# Patient Record
Sex: FEMALE | Race: WHITE | Employment: FULL TIME | URBAN - METROPOLITAN AREA
[De-identification: names, ages, dates, MRNs, and addresses within clinical notes are randomized per-mention and may not be internally consistent; named-entity substitution may affect disease eponyms.]

---

## 2017-07-19 PROCEDURE — 88175 CYTOPATH C/V AUTO FLUID REDO: CPT | Performed by: OBSTETRICS & GYNECOLOGY

## 2017-07-19 PROCEDURE — 87624 HPV HI-RISK TYP POOLED RSLT: CPT | Performed by: OBSTETRICS & GYNECOLOGY

## 2018-01-19 PROBLEM — Z34.00 SUPERVISION OF NORMAL FIRST PREGNANCY (HCC): Status: ACTIVE | Noted: 2018-01-19

## 2018-01-19 PROBLEM — Z34.00 SUPERVISION OF NORMAL FIRST PREGNANCY: Status: ACTIVE | Noted: 2018-01-19

## 2018-01-30 PROCEDURE — 86901 BLOOD TYPING SEROLOGIC RH(D): CPT | Performed by: OBSTETRICS & GYNECOLOGY

## 2018-01-30 PROCEDURE — 85025 COMPLETE CBC W/AUTO DIFF WBC: CPT | Performed by: OBSTETRICS & GYNECOLOGY

## 2018-01-30 PROCEDURE — 87086 URINE CULTURE/COLONY COUNT: CPT | Performed by: OBSTETRICS & GYNECOLOGY

## 2018-01-30 PROCEDURE — 86762 RUBELLA ANTIBODY: CPT | Performed by: OBSTETRICS & GYNECOLOGY

## 2018-01-30 PROCEDURE — 86850 RBC ANTIBODY SCREEN: CPT | Performed by: OBSTETRICS & GYNECOLOGY

## 2018-01-30 PROCEDURE — 87491 CHLMYD TRACH DNA AMP PROBE: CPT | Performed by: OBSTETRICS & GYNECOLOGY

## 2018-01-30 PROCEDURE — 87389 HIV-1 AG W/HIV-1&-2 AB AG IA: CPT | Performed by: OBSTETRICS & GYNECOLOGY

## 2018-01-30 PROCEDURE — 87591 N.GONORRHOEAE DNA AMP PROB: CPT | Performed by: OBSTETRICS & GYNECOLOGY

## 2018-01-30 PROCEDURE — 36415 COLL VENOUS BLD VENIPUNCTURE: CPT | Performed by: OBSTETRICS & GYNECOLOGY

## 2018-01-30 PROCEDURE — 87340 HEPATITIS B SURFACE AG IA: CPT | Performed by: OBSTETRICS & GYNECOLOGY

## 2018-01-30 PROCEDURE — 86900 BLOOD TYPING SEROLOGIC ABO: CPT | Performed by: OBSTETRICS & GYNECOLOGY

## 2018-01-30 PROCEDURE — 86780 TREPONEMA PALLIDUM: CPT | Performed by: OBSTETRICS & GYNECOLOGY

## 2018-02-14 ENCOUNTER — HOSPITAL ENCOUNTER (OUTPATIENT)
Age: 33
Discharge: HOME | End: 2018-02-14
Attending: FAMILY MEDICINE
Payer: COMMERCIAL

## 2018-02-14 VITALS
DIASTOLIC BLOOD PRESSURE: 83 MMHG | WEIGHT: 180 LBS | HEART RATE: 85 BPM | OXYGEN SATURATION: 100 % | SYSTOLIC BLOOD PRESSURE: 126 MMHG | HEIGHT: 67 IN | TEMPERATURE: 99 F | BODY MASS INDEX: 28.25 KG/M2 | RESPIRATION RATE: 16 BRPM

## 2018-02-14 DIAGNOSIS — R68.89 FLU-LIKE SYMPTOMS: Primary | ICD-10-CM

## 2018-02-14 DIAGNOSIS — R10.9 ABDOMINAL CRAMPING: ICD-10-CM

## 2018-02-14 DIAGNOSIS — J02.9 ACUTE PHARYNGITIS, UNSPECIFIED ETIOLOGY: ICD-10-CM

## 2018-02-14 LAB
POCT BILIRUBIN URINE: NEGATIVE
POCT BLOOD URINE: NEGATIVE
POCT GLUCOSE URINE: NEGATIVE MG/DL
POCT KETONE URINE: NEGATIVE MG/DL
POCT LEUKOCYTE ESTERASE URINE: NEGATIVE
POCT NITRITE URINE: NEGATIVE
POCT PH URINE: 7 (ref 5–8)
POCT PROTEIN URINE: NEGATIVE MG/DL
POCT RAPID STREP: NEGATIVE
POCT SPECIFIC GRAVITY URINE: 1.01
POCT URINE CLARITY: CLEAR
POCT URINE COLOR: YELLOW
POCT UROBILINOGEN URINE: 0.2 MG/DL

## 2018-02-14 PROCEDURE — 87081 CULTURE SCREEN ONLY: CPT | Performed by: FAMILY MEDICINE

## 2018-02-14 PROCEDURE — 87430 STREP A AG IA: CPT | Performed by: FAMILY MEDICINE

## 2018-02-14 PROCEDURE — 99204 OFFICE O/P NEW MOD 45 MIN: CPT

## 2018-02-14 PROCEDURE — 81002 URINALYSIS NONAUTO W/O SCOPE: CPT | Performed by: FAMILY MEDICINE

## 2018-02-14 RX ORDER — ONDANSETRON 4 MG/1
4 TABLET, ORALLY DISINTEGRATING ORAL EVERY 12 HOURS PRN
Qty: 10 TABLET | Refills: 0 | Status: SHIPPED | OUTPATIENT
Start: 2018-02-14 | End: 2018-05-14 | Stop reason: ALTCHOICE

## 2018-02-14 NOTE — ED INITIAL ASSESSMENT (HPI)
Pt c/o feeling nasal congestion, headache, mild sore throat and co-worker had sore throat, cough, and sneezing. Pt is 11 weeks pregnant, 1st pregnancy. Pt reports she has seen OB GYN for 1st appointment.  Pt also reports abdominal cramping across mid abdome

## 2018-02-15 NOTE — ED PROVIDER NOTES
Patient Seen in: [facility]    History   Patient presents with:  Cough/URI    Stated Complaint: COLD WANTS STREP TESTING, PREGNANT    HPI    21-year-old  1 para 0 female who is currently 11 weeks pregnant presents to and negative except as noted above.     Physical Exam   ED Triage Vitals [02/14/18 1729]  BP: 126/83  Pulse: 85  Resp: 16  Temp: 98.9 °F (37.2 °C)  Temp src: Temporal  SpO2: 100 %  O2 Device: None (Room air)    Current:/83   Pulse 85   Temp 98.9 °F (3 far as the flulike symptoms are concerned. Her abdominal symptoms could be related to the flu. I however paged and spoke to [place] [address] [facility]. Dr. Rosa Paula was on call for Dr. Penny Maharaj. Discussed the case with him.   he recommended close follow-up w

## 2018-02-21 PROCEDURE — 36415 COLL VENOUS BLD VENIPUNCTURE: CPT | Performed by: OBSTETRICS & GYNECOLOGY

## 2018-02-21 PROCEDURE — 81508 FTL CGEN ABNOR TWO PROTEINS: CPT | Performed by: OBSTETRICS & GYNECOLOGY

## 2018-03-19 PROCEDURE — 82105 ALPHA-FETOPROTEIN SERUM: CPT | Performed by: OBSTETRICS & GYNECOLOGY

## 2018-03-19 PROCEDURE — 36415 COLL VENOUS BLD VENIPUNCTURE: CPT | Performed by: OBSTETRICS & GYNECOLOGY

## 2018-06-04 PROCEDURE — 86780 TREPONEMA PALLIDUM: CPT | Performed by: OBSTETRICS & GYNECOLOGY

## 2018-06-04 PROCEDURE — 82950 GLUCOSE TEST: CPT | Performed by: OBSTETRICS & GYNECOLOGY

## 2018-06-04 PROCEDURE — 87389 HIV-1 AG W/HIV-1&-2 AB AG IA: CPT | Performed by: OBSTETRICS & GYNECOLOGY

## 2018-06-11 PROCEDURE — 36415 COLL VENOUS BLD VENIPUNCTURE: CPT | Performed by: OBSTETRICS & GYNECOLOGY

## 2018-06-11 PROCEDURE — 82952 GTT-ADDED SAMPLES: CPT | Performed by: OBSTETRICS & GYNECOLOGY

## 2018-06-11 PROCEDURE — 82951 GLUCOSE TOLERANCE TEST (GTT): CPT | Performed by: OBSTETRICS & GYNECOLOGY

## 2018-08-03 ENCOUNTER — HOSPITAL ENCOUNTER (INPATIENT)
Facility: HOSPITAL | Age: 33
LOS: 6 days | Discharge: HOME | End: 2018-08-09
Attending: OBSTETRICS & GYNECOLOGY | Admitting: OBSTETRICS & GYNECOLOGY
Payer: COMMERCIAL

## 2018-08-03 PROBLEM — Z34.90 PREGNANCY (HCC): Status: ACTIVE | Noted: 2018-08-03

## 2018-08-03 PROBLEM — Z34.90 PREGNANCY: Status: ACTIVE | Noted: 2018-08-03

## 2018-08-03 LAB
ALBUMIN SERPL-MCNC: 2.6 G/DL (ref 3.5–4.8)
ALBUMIN/GLOB SERPL: 0.7 {RATIO} (ref 1–2)
ALP LIVER SERPL-CCNC: 161 U/L (ref 37–98)
ALT SERPL-CCNC: 52 U/L (ref 14–54)
ANION GAP SERPL CALC-SCNC: 8 MMOL/L (ref 0–18)
AST SERPL-CCNC: 66 U/L (ref 15–41)
BASOPHILS # BLD AUTO: 0.03 X10(3) UL (ref 0–0.1)
BASOPHILS NFR BLD AUTO: 0.3 %
BILIRUB SERPL-MCNC: 0.6 MG/DL (ref 0.1–2)
BUN BLD-MCNC: 8 MG/DL (ref 8–20)
BUN/CREAT SERPL: 9.4 (ref 10–20)
CALCIUM BLD-MCNC: 8.2 MG/DL (ref 8.3–10.3)
CHLORIDE SERPL-SCNC: 105 MMOL/L (ref 101–111)
CO2 SERPL-SCNC: 24 MMOL/L (ref 22–32)
CREAT BLD-MCNC: 0.85 MG/DL (ref 0.55–1.02)
EOSINOPHIL # BLD AUTO: 0.07 X10(3) UL (ref 0–0.3)
EOSINOPHIL NFR BLD AUTO: 0.6 %
ERYTHROCYTE [DISTWIDTH] IN BLOOD BY AUTOMATED COUNT: 12.5 % (ref 11.5–16)
GLOBULIN PLAS-MCNC: 3.7 G/DL (ref 2.5–3.7)
GLUCOSE BLD-MCNC: 129 MG/DL (ref 70–99)
HCT VFR BLD AUTO: 30.5 % (ref 34–50)
HGB BLD-MCNC: 10.5 G/DL (ref 12–16)
IMMATURE GRANULOCYTE COUNT: 0.1 X10(3) UL (ref 0–1)
IMMATURE GRANULOCYTE RATIO %: 0.8 %
LYMPHOCYTES # BLD AUTO: 1.68 X10(3) UL (ref 0.9–4)
LYMPHOCYTES NFR BLD AUTO: 14.3 %
M PROTEIN MFR SERPL ELPH: 6.3 G/DL (ref 6.1–8.3)
MCH RBC QN AUTO: 32.2 PG (ref 27–33.2)
MCHC RBC AUTO-ENTMCNC: 34.4 G/DL (ref 31–37)
MCV RBC AUTO: 93.6 FL (ref 81–100)
MONOCYTES # BLD AUTO: 0.82 X10(3) UL (ref 0.1–1)
MONOCYTES NFR BLD AUTO: 7 %
NEUTROPHIL ABS PRELIM: 9.08 X10 (3) UL (ref 1.3–6.7)
NEUTROPHILS # BLD AUTO: 9.08 X10(3) UL (ref 1.3–6.7)
NEUTROPHILS NFR BLD AUTO: 77 %
OSMOLALITY SERPL CALC.SUM OF ELEC: 284 MOSM/KG (ref 275–295)
PLATELET # BLD AUTO: 110 10(3)UL (ref 150–450)
POTASSIUM SERPL-SCNC: 3.4 MMOL/L (ref 3.6–5.1)
RBC # BLD AUTO: 3.26 X10(6)UL (ref 3.8–5.1)
RED CELL DISTRIBUTION WIDTH-SD: 42.2 FL (ref 35.1–46.3)
SODIUM SERPL-SCNC: 137 MMOL/L (ref 136–144)
URATE SERPL-MCNC: 5.6 MG/DL (ref 2.4–8)
WBC # BLD AUTO: 11.8 X10(3) UL (ref 4–13)

## 2018-08-03 PROCEDURE — 85025 COMPLETE CBC W/AUTO DIFF WBC: CPT | Performed by: OBSTETRICS & GYNECOLOGY

## 2018-08-03 PROCEDURE — 80053 COMPREHEN METABOLIC PANEL: CPT | Performed by: OBSTETRICS & GYNECOLOGY

## 2018-08-03 PROCEDURE — 87081 CULTURE SCREEN ONLY: CPT | Performed by: OBSTETRICS & GYNECOLOGY

## 2018-08-03 PROCEDURE — 84550 ASSAY OF BLOOD/URIC ACID: CPT | Performed by: OBSTETRICS & GYNECOLOGY

## 2018-08-03 PROCEDURE — 87653 STREP B DNA AMP PROBE: CPT | Performed by: OBSTETRICS & GYNECOLOGY

## 2018-08-03 RX ORDER — BETAMETHASONE SODIUM PHOSPHATE AND BETAMETHASONE ACETATE 3; 3 MG/ML; MG/ML
12 INJECTION, SUSPENSION INTRA-ARTICULAR; INTRALESIONAL; INTRAMUSCULAR; SOFT TISSUE EVERY 24 HOURS
Status: COMPLETED | OUTPATIENT
Start: 2018-08-03 | End: 2018-08-04

## 2018-08-03 NOTE — PLAN OF CARE
Problem: Patient/Family Goals  Goal: Patient/Family Long Term Goal  Patient's Long Term Goal: Maintain pregnanct till term    Interventions:  - See additional Care Plan goals for specific interventions   Outcome: Progressing    Goal: Patient/Family Short T

## 2018-08-03 NOTE — PROGRESS NOTES
Pt is a 29year old female admitted to 122/122-A. Patient presents with:  R/o Pih     Pt is  35w6d intra-uterine pregnancy. Pt transferred via wheelchair in stable condition to room 122 from triage 5. Plan of care reviewed.  Call light within

## 2018-08-03 NOTE — PROGRESS NOTES
Pt is a 29year old female admitted to TRG5/TRG5-A, Patient presents with:  R/o Pih     Pt is 35w6d intra-uterine pregnancy. Denies any leaking of fluid. Reports +fetal movement. History obtained, consents signed.  Oriented to room, staff, and plan of car

## 2018-08-04 LAB
ALBUMIN SERPL-MCNC: 2.6 G/DL (ref 3.5–4.8)
ALBUMIN/GLOB SERPL: 0.7 {RATIO} (ref 1–2)
ALP LIVER SERPL-CCNC: 159 U/L (ref 37–98)
ALT SERPL-CCNC: 56 U/L (ref 14–54)
ANION GAP SERPL CALC-SCNC: 12 MMOL/L (ref 0–18)
ANTIBODY SCREEN: NEGATIVE
AST SERPL-CCNC: 65 U/L (ref 15–41)
BASOPHILS # BLD AUTO: 0.02 X10(3) UL (ref 0–0.1)
BASOPHILS NFR BLD AUTO: 0.1 %
BILIRUB SERPL-MCNC: 0.6 MG/DL (ref 0.1–2)
BUN BLD-MCNC: 5 MG/DL (ref 8–20)
BUN/CREAT SERPL: 7.7 (ref 10–20)
CALCIUM BLD-MCNC: 8.9 MG/DL (ref 8.3–10.3)
CHLORIDE SERPL-SCNC: 108 MMOL/L (ref 101–111)
CO2 SERPL-SCNC: 21 MMOL/L (ref 22–32)
CREAT BLD-MCNC: 0.65 MG/DL (ref 0.55–1.02)
CREAT UR-SCNC: 1.59 G/24 HR (ref 0.6–1.8)
EOSINOPHIL # BLD AUTO: 0.01 X10(3) UL (ref 0–0.3)
EOSINOPHIL NFR BLD AUTO: 0.1 %
ERYTHROCYTE [DISTWIDTH] IN BLOOD BY AUTOMATED COUNT: 12.3 % (ref 11.5–16)
GLOBULIN PLAS-MCNC: 3.8 G/DL (ref 2.5–3.7)
GLUCOSE BLD-MCNC: 89 MG/DL (ref 70–99)
HCT VFR BLD AUTO: 31.8 % (ref 34–50)
HGB BLD-MCNC: 10.7 G/DL (ref 12–16)
IMMATURE GRANULOCYTE COUNT: 0.4 X10(3) UL (ref 0–1)
IMMATURE GRANULOCYTE RATIO %: 2.3 %
LYMPHOCYTES # BLD AUTO: 1.3 X10(3) UL (ref 0.9–4)
LYMPHOCYTES NFR BLD AUTO: 7.5 %
M PROTEIN 24H UR ELPH-MRATE: 279 MG/24 HR (ref ?–100)
M PROTEIN MFR SERPL ELPH: 6.4 G/DL (ref 6.1–8.3)
MCH RBC QN AUTO: 31.8 PG (ref 27–33.2)
MCHC RBC AUTO-ENTMCNC: 33.6 G/DL (ref 31–37)
MCV RBC AUTO: 94.6 FL (ref 81–100)
MONOCYTES # BLD AUTO: 0.65 X10(3) UL (ref 0.1–1)
MONOCYTES NFR BLD AUTO: 3.8 %
NEUTROPHIL ABS PRELIM: 14.92 X10 (3) UL (ref 1.3–6.7)
NEUTROPHILS # BLD AUTO: 14.92 X10(3) UL (ref 1.3–6.7)
NEUTROPHILS NFR BLD AUTO: 86.2 %
OSMOLALITY SERPL CALC.SUM OF ELEC: 289 MOSM/KG (ref 275–295)
PLATELET # BLD AUTO: 126 10(3)UL (ref 150–450)
POTASSIUM SERPL-SCNC: 3.7 MMOL/L (ref 3.6–5.1)
RBC # BLD AUTO: 3.36 X10(6)UL (ref 3.8–5.1)
RED CELL DISTRIBUTION WIDTH-SD: 41.9 FL (ref 35.1–46.3)
RH BLOOD TYPE: POSITIVE
SODIUM SERPL-SCNC: 141 MMOL/L (ref 136–144)
SPECIMEN VOL UR: 4100 ML
SPECIMEN VOL UR: 4100 ML
T PALLIDUM AB SER QL IA: NONREACTIVE
URATE SERPL-MCNC: 5.6 MG/DL (ref 2.4–8)
WBC # BLD AUTO: 17.3 X10(3) UL (ref 4–13)

## 2018-08-04 PROCEDURE — 86901 BLOOD TYPING SEROLOGIC RH(D): CPT | Performed by: OBSTETRICS & GYNECOLOGY

## 2018-08-04 PROCEDURE — 86900 BLOOD TYPING SEROLOGIC ABO: CPT | Performed by: OBSTETRICS & GYNECOLOGY

## 2018-08-04 PROCEDURE — 85025 COMPLETE CBC W/AUTO DIFF WBC: CPT | Performed by: OBSTETRICS & GYNECOLOGY

## 2018-08-04 PROCEDURE — 84550 ASSAY OF BLOOD/URIC ACID: CPT | Performed by: OBSTETRICS & GYNECOLOGY

## 2018-08-04 PROCEDURE — 3E0P7VZ INTRODUCTION OF HORMONE INTO FEMALE REPRODUCTIVE, VIA NATURAL OR ARTIFICIAL OPENING: ICD-10-PCS | Performed by: OBSTETRICS & GYNECOLOGY

## 2018-08-04 PROCEDURE — 80053 COMPREHEN METABOLIC PANEL: CPT | Performed by: OBSTETRICS & GYNECOLOGY

## 2018-08-04 PROCEDURE — 86850 RBC ANTIBODY SCREEN: CPT | Performed by: OBSTETRICS & GYNECOLOGY

## 2018-08-04 PROCEDURE — 86780 TREPONEMA PALLIDUM: CPT | Performed by: OBSTETRICS & GYNECOLOGY

## 2018-08-04 PROCEDURE — 82570 ASSAY OF URINE CREATININE: CPT | Performed by: OBSTETRICS & GYNECOLOGY

## 2018-08-04 PROCEDURE — 84156 ASSAY OF PROTEIN URINE: CPT | Performed by: OBSTETRICS & GYNECOLOGY

## 2018-08-04 RX ORDER — TRISODIUM CITRATE DIHYDRATE AND CITRIC ACID MONOHYDRATE 500; 334 MG/5ML; MG/5ML
30 SOLUTION ORAL AS NEEDED
Status: DISCONTINUED | OUTPATIENT
Start: 2018-08-04 | End: 2018-08-07 | Stop reason: HOSPADM

## 2018-08-04 RX ORDER — IBUPROFEN 600 MG/1
600 TABLET ORAL ONCE AS NEEDED
Status: ACTIVE | OUTPATIENT
Start: 2018-08-04 | End: 2018-08-06

## 2018-08-04 RX ORDER — ZOLPIDEM TARTRATE 5 MG/1
5 TABLET ORAL NIGHTLY PRN
Status: DISCONTINUED | OUTPATIENT
Start: 2018-08-04 | End: 2018-08-07 | Stop reason: HOSPADM

## 2018-08-04 RX ORDER — DEXTROSE, SODIUM CHLORIDE, SODIUM LACTATE, POTASSIUM CHLORIDE, AND CALCIUM CHLORIDE 5; .6; .31; .03; .02 G/100ML; G/100ML; G/100ML; G/100ML; G/100ML
INJECTION, SOLUTION INTRAVENOUS AS NEEDED
Status: DISCONTINUED | OUTPATIENT
Start: 2018-08-04 | End: 2018-08-07 | Stop reason: HOSPADM

## 2018-08-04 RX ORDER — CEFAZOLIN SODIUM/WATER 2 G/20 ML
2 SYRINGE (ML) INTRAVENOUS EVERY 8 HOURS
Status: DISCONTINUED | OUTPATIENT
Start: 2018-08-05 | End: 2018-08-07 | Stop reason: HOSPADM

## 2018-08-04 RX ORDER — SODIUM CHLORIDE, SODIUM LACTATE, POTASSIUM CHLORIDE, CALCIUM CHLORIDE 600; 310; 30; 20 MG/100ML; MG/100ML; MG/100ML; MG/100ML
INJECTION, SOLUTION INTRAVENOUS CONTINUOUS
Status: DISCONTINUED | OUTPATIENT
Start: 2018-08-04 | End: 2018-08-07 | Stop reason: HOSPADM

## 2018-08-04 RX ORDER — TERBUTALINE SULFATE 1 MG/ML
0.25 INJECTION, SOLUTION SUBCUTANEOUS AS NEEDED
Status: DISCONTINUED | OUTPATIENT
Start: 2018-08-04 | End: 2018-08-07 | Stop reason: HOSPADM

## 2018-08-04 NOTE — PROGRESS NOTES
[facility]      Labor Progress Note    Gail Gaona  [facility] Patient Status:  Inpatient    1985 MRN [id number]   Location [facility] [facility] Attending Benedict Bob MD   Hosp Day # 1 PCP Ti Mcghee MD     SUBJECTIVE:    Interval

## 2018-08-04 NOTE — CONSULTS
[facility]  Maternal-Fetal Medicine Inpatient Consultation    Date of Admission:  8/3/2018  Date of Consult:  8/4/2018    Reason for Consult:   preeclampsia    History of Present Illness:  Salud Castillo is a a(n) 29year old female.  G1 wit that she drinks about 1.2 oz of alcohol per week . She reports that she does not use drugs.     Allergies:    Pcns [Penicillins]      HIVES    Medications:    Current Facility-Administered Medications:   •  betamethasone sod phos & acet (CELESTONE) 6 (3-3) participate in the care of your patient. Please do not hesitate to contact me if additional questions or concerns arise. The majority of the time (>50%) was spent in review of records, consultation and coordination of care.   Our discussion is summarized

## 2018-08-04 NOTE — H&P
[address] and Delivery Prenatal History and Physical Interval Addendum  Please see full Prenatal Record for this pregnancy      SUBJECTIVE:    Interval History:      This is a pregnancy at 35w6d weeks admitted for observation due to elevated BP

## 2018-08-05 LAB
ALBUMIN SERPL-MCNC: 2.7 G/DL (ref 3.5–4.8)
ALBUMIN/GLOB SERPL: 0.8 {RATIO} (ref 1–2)
ALP LIVER SERPL-CCNC: 154 U/L (ref 37–98)
ALT SERPL-CCNC: 53 U/L (ref 14–54)
ANION GAP SERPL CALC-SCNC: 12 MMOL/L (ref 0–18)
AST SERPL-CCNC: 48 U/L (ref 15–41)
BILIRUB SERPL-MCNC: 0.4 MG/DL (ref 0.1–2)
BUN BLD-MCNC: 7 MG/DL (ref 8–20)
BUN/CREAT SERPL: 7.9 (ref 10–20)
CALCIUM BLD-MCNC: 8.5 MG/DL (ref 8.3–10.3)
CHLORIDE SERPL-SCNC: 106 MMOL/L (ref 101–111)
CO2 SERPL-SCNC: 22 MMOL/L (ref 22–32)
CREAT BLD-MCNC: 0.89 MG/DL (ref 0.55–1.02)
ERYTHROCYTE [DISTWIDTH] IN BLOOD BY AUTOMATED COUNT: 12.8 % (ref 11.5–16)
GLOBULIN PLAS-MCNC: 3.6 G/DL (ref 2.5–3.7)
GLUCOSE BLD-MCNC: 94 MG/DL (ref 70–99)
HCT VFR BLD AUTO: 30.9 % (ref 34–50)
HGB BLD-MCNC: 10.4 G/DL (ref 12–16)
M PROTEIN MFR SERPL ELPH: 6.3 G/DL (ref 6.1–8.3)
MCH RBC QN AUTO: 32.1 PG (ref 27–33.2)
MCHC RBC AUTO-ENTMCNC: 33.7 G/DL (ref 31–37)
MCV RBC AUTO: 95.4 FL (ref 81–100)
OSMOLALITY SERPL CALC.SUM OF ELEC: 288 MOSM/KG (ref 275–295)
PLATELET # BLD AUTO: 158 10(3)UL (ref 150–450)
POTASSIUM SERPL-SCNC: 3.6 MMOL/L (ref 3.6–5.1)
RBC # BLD AUTO: 3.24 X10(6)UL (ref 3.8–5.1)
RED CELL DISTRIBUTION WIDTH-SD: 42.8 FL (ref 35.1–46.3)
SODIUM SERPL-SCNC: 140 MMOL/L (ref 136–144)
URATE SERPL-MCNC: 5.7 MG/DL (ref 2.4–8)
WBC # BLD AUTO: 19.5 X10(3) UL (ref 4–13)

## 2018-08-05 PROCEDURE — 80053 COMPREHEN METABOLIC PANEL: CPT | Performed by: OBSTETRICS & GYNECOLOGY

## 2018-08-05 PROCEDURE — 3E033VJ INTRODUCTION OF OTHER HORMONE INTO PERIPHERAL VEIN, PERCUTANEOUS APPROACH: ICD-10-PCS | Performed by: OBSTETRICS & GYNECOLOGY

## 2018-08-05 PROCEDURE — 84550 ASSAY OF BLOOD/URIC ACID: CPT | Performed by: OBSTETRICS & GYNECOLOGY

## 2018-08-05 PROCEDURE — 85027 COMPLETE CBC AUTOMATED: CPT | Performed by: OBSTETRICS & GYNECOLOGY

## 2018-08-05 RX ORDER — HYDROMORPHONE HYDROCHLORIDE 1 MG/ML
1 INJECTION, SOLUTION INTRAMUSCULAR; INTRAVENOUS; SUBCUTANEOUS
Status: DISCONTINUED | OUTPATIENT
Start: 2018-08-05 | End: 2018-08-05 | Stop reason: ALTCHOICE

## 2018-08-05 RX ORDER — MORPHINE SULFATE 4 MG/ML
5 INJECTION, SOLUTION INTRAMUSCULAR; INTRAVENOUS
Status: DISCONTINUED | OUTPATIENT
Start: 2018-08-05 | End: 2018-08-05 | Stop reason: ALTCHOICE

## 2018-08-05 NOTE — PROGRESS NOTES
[facility]      Labor Progress Note    Zaid Husainlpine  [facility] Patient Status:  Inpatient    1985 MRN [id number]   Location [facility] [facility] Attending Joy Baez MD   Hosp Day # 2 PCP Honey Boss MD     SUBJECTIVE:    Interval

## 2018-08-06 PROBLEM — O99.820 GROUP B STREPTOCOCCUS CARRIER, +RV CULTURE, CURRENTLY PREGNANT: Status: ACTIVE | Noted: 2018-08-06

## 2018-08-06 PROBLEM — O99.820 GROUP B STREPTOCOCCUS CARRIER, +RV CULTURE, CURRENTLY PREGNANT (HCC): Status: ACTIVE | Noted: 2018-08-06

## 2018-08-06 PROCEDURE — 10907ZC DRAINAGE OF AMNIOTIC FLUID, THERAPEUTIC FROM PRODUCTS OF CONCEPTION, VIA NATURAL OR ARTIFICIAL OPENING: ICD-10-PCS | Performed by: OBSTETRICS & GYNECOLOGY

## 2018-08-06 RX ORDER — CALCIUM GLUCONATE 94 MG/ML
1 INJECTION, SOLUTION INTRAVENOUS ONCE AS NEEDED
Status: ACTIVE | OUTPATIENT
Start: 2018-08-06 | End: 2018-08-06

## 2018-08-06 RX ORDER — NALBUPHINE HCL 10 MG/ML
2.5 AMPUL (ML) INJECTION
Status: DISCONTINUED | OUTPATIENT
Start: 2018-08-06 | End: 2018-08-08

## 2018-08-06 RX ORDER — CALCIUM GLUCONATE 94 MG/ML
1 INJECTION, SOLUTION INTRAVENOUS ONCE AS NEEDED
Status: DISCONTINUED | OUTPATIENT
Start: 2018-08-06 | End: 2018-08-06 | Stop reason: SDUPTHER

## 2018-08-06 RX ORDER — EPHEDRINE SULFATE/0.9% NACL/PF 25 MG/5 ML
5 SYRINGE (ML) INTRAVENOUS AS NEEDED
Status: DISCONTINUED | OUTPATIENT
Start: 2018-08-06 | End: 2018-08-08

## 2018-08-06 RX ORDER — ONDANSETRON 2 MG/ML
4 INJECTION INTRAMUSCULAR; INTRAVENOUS EVERY 6 HOURS PRN
Status: DISCONTINUED | OUTPATIENT
Start: 2018-08-06 | End: 2018-08-07

## 2018-08-06 NOTE — PROGRESS NOTES
Dr. Viraj Ford notified of patient blood pressures. Orders given to bypass labetalol and to begin magnesium sulfate protocol immediately.  Will continue to monitor patient

## 2018-08-06 NOTE — PROGRESS NOTES
Report received from [facility]. Patient received in stable condition.  Cervidil to be d/c'd at 0800

## 2018-08-06 NOTE — PROGRESS NOTES
Labor Progress Note    Pt concerned about severe discomfort with SVEs during this admission. On 20mU/min pitocin at this time with contractions q2-3 minutes, but only mild cramping. Discussed need for AROM attempt.  Offered iv stadol prior to SVE with pt

## 2018-08-06 NOTE — PROGRESS NOTES
Uncomfortable w/RN cervical exam this AM w/cervidil removal  RN states difficult to say how dilated due to pt tolerance    IOL for preeclampsia with severe features at 39 w  S/p betamethasone 8/3-4  Initial labs showed decreased platelets and elevated AST

## 2018-08-07 LAB
ALBUMIN SERPL-MCNC: 2.2 G/DL (ref 3.5–4.8)
ALBUMIN SERPL-MCNC: 2.3 G/DL (ref 3.5–4.8)
ALBUMIN/GLOB SERPL: 0.7 {RATIO} (ref 1–2)
ALBUMIN/GLOB SERPL: 0.8 {RATIO} (ref 1–2)
ALP LIVER SERPL-CCNC: 141 U/L (ref 37–98)
ALP LIVER SERPL-CCNC: 155 U/L (ref 37–98)
ALT SERPL-CCNC: 125 U/L (ref 14–54)
ALT SERPL-CCNC: 98 U/L (ref 14–54)
ANION GAP SERPL CALC-SCNC: 8 MMOL/L (ref 0–18)
ANION GAP SERPL CALC-SCNC: 8 MMOL/L (ref 0–18)
AST SERPL-CCNC: 132 U/L (ref 15–41)
AST SERPL-CCNC: 196 U/L (ref 15–41)
BAND %: 5 %
BAND %: 9 %
BASOPHIL % MANUAL: 0 %
BASOPHIL % MANUAL: 0 %
BASOPHIL ABSOLUTE MANUAL: 0 X10(3) UL (ref 0–0.1)
BASOPHIL ABSOLUTE MANUAL: 0 X10(3) UL (ref 0–0.1)
BILIRUB SERPL-MCNC: 1.2 MG/DL (ref 0.1–2)
BILIRUB SERPL-MCNC: 1.3 MG/DL (ref 0.1–2)
BUN BLD-MCNC: 10 MG/DL (ref 8–20)
BUN BLD-MCNC: 9 MG/DL (ref 8–20)
BUN/CREAT SERPL: 8.5 (ref 10–20)
BUN/CREAT SERPL: 8.6 (ref 10–20)
CALCIUM BLD-MCNC: 6.8 MG/DL (ref 8.3–10.3)
CALCIUM BLD-MCNC: 7.1 MG/DL (ref 8.3–10.3)
CHLORIDE SERPL-SCNC: 103 MMOL/L (ref 101–111)
CHLORIDE SERPL-SCNC: 106 MMOL/L (ref 101–111)
CO2 SERPL-SCNC: 26 MMOL/L (ref 22–32)
CO2 SERPL-SCNC: 26 MMOL/L (ref 22–32)
CREAT BLD-MCNC: 1.06 MG/DL (ref 0.55–1.02)
CREAT BLD-MCNC: 1.16 MG/DL (ref 0.55–1.02)
EOSINOPHIL % MANUAL: 0 %
EOSINOPHIL % MANUAL: 0 %
EOSINOPHIL ABSOLUTE MANUAL: 0 X10(3) UL (ref 0–0.3)
EOSINOPHIL ABSOLUTE MANUAL: 0 X10(3) UL (ref 0–0.3)
ERYTHROCYTE [DISTWIDTH] IN BLOOD BY AUTOMATED COUNT: 13.5 % (ref 11.5–16)
ERYTHROCYTE [DISTWIDTH] IN BLOOD BY AUTOMATED COUNT: 13.5 % (ref 11.5–16)
GLOBULIN PLAS-MCNC: 2.8 G/DL (ref 2.5–3.7)
GLOBULIN PLAS-MCNC: 3.2 G/DL (ref 2.5–3.7)
GLUCOSE BLD-MCNC: 136 MG/DL (ref 70–99)
GLUCOSE BLD-MCNC: 86 MG/DL (ref 70–99)
HAV IGM SER QL: 5.2 MG/DL (ref 1.8–2.5)
HAV IGM SER QL: 6.1 MG/DL (ref 1.8–2.5)
HCT VFR BLD AUTO: 28.6 % (ref 34–50)
HCT VFR BLD AUTO: 32.7 % (ref 34–50)
HGB BLD-MCNC: 11 G/DL (ref 12–16)
HGB BLD-MCNC: 9.7 G/DL (ref 12–16)
LYMPHOCYTE % MANUAL: 6 %
LYMPHOCYTE % MANUAL: 7 %
LYMPHOCYTE ABSOLUTE MANUAL: 1.15 X10(3) UL (ref 0.9–4)
LYMPHOCYTE ABSOLUTE MANUAL: 1.65 X10(3) UL (ref 0.9–4)
M PROTEIN MFR SERPL ELPH: 5 G/DL (ref 6.1–8.3)
M PROTEIN MFR SERPL ELPH: 5.5 G/DL (ref 6.1–8.3)
MCH RBC QN AUTO: 32.2 PG (ref 27–33.2)
MCH RBC QN AUTO: 32.3 PG (ref 27–33.2)
MCHC RBC AUTO-ENTMCNC: 33.6 G/DL (ref 31–37)
MCHC RBC AUTO-ENTMCNC: 33.9 G/DL (ref 31–37)
MCV RBC AUTO: 95.3 FL (ref 81–100)
MCV RBC AUTO: 95.6 FL (ref 81–100)
METAMYELOCYTE %: 1 %
METAMYELOCYTE ABSOLUTE MANUAL: 0.24 X10(3) UL (ref ?–0.01)
MONOCYTE % MANUAL: 2 %
MONOCYTE % MANUAL: 6 %
MONOCYTE ABSOLUTE MANUAL: 0.47 X10(3) UL (ref 0.1–1)
MONOCYTE ABSOLUTE MANUAL: 1.15 X10(3) UL (ref 0.1–1)
MORPHOLOGY: NORMAL
MORPHOLOGY: NORMAL
NEUTROPHIL ABS PRELIM: 15.57 X10 (3) UL (ref 1.3–6.7)
NEUTROPHIL ABS PRELIM: 19.65 X10 (3) UL (ref 1.3–6.7)
NEUTROPHIL ABSOLUTE MANUAL: 16.9 X10(3) UL (ref 1.3–6.7)
NEUTROPHIL ABSOLUTE MANUAL: 21.24 X10(3) UL (ref 1.3–6.7)
NEUTROPHILS % MANUAL: 79 %
NEUTROPHILS % MANUAL: 85 %
OSMOLALITY SERPL CALC.SUM OF ELEC: 285 MOSM/KG (ref 275–295)
OSMOLALITY SERPL CALC.SUM OF ELEC: 288 MOSM/KG (ref 275–295)
PLATELET # BLD AUTO: 59 10(3)UL (ref 150–450)
PLATELET # BLD AUTO: 75 10(3)UL (ref 150–450)
PLATELET MORPHOLOGY: NORMAL
PLATELET MORPHOLOGY: NORMAL
POTASSIUM SERPL-SCNC: 3.2 MMOL/L (ref 3.6–5.1)
POTASSIUM SERPL-SCNC: 3.7 MMOL/L (ref 3.6–5.1)
RBC # BLD AUTO: 3 X10(6)UL (ref 3.8–5.1)
RBC # BLD AUTO: 3.42 X10(6)UL (ref 3.8–5.1)
RED CELL DISTRIBUTION WIDTH-SD: 44.7 FL (ref 35.1–46.3)
RED CELL DISTRIBUTION WIDTH-SD: 45.4 FL (ref 35.1–46.3)
SODIUM SERPL-SCNC: 137 MMOL/L (ref 136–144)
SODIUM SERPL-SCNC: 140 MMOL/L (ref 136–144)
TOTAL CELLS COUNTED: 100
TOTAL CELLS COUNTED: 100
URATE SERPL-MCNC: 5.3 MG/DL (ref 2.4–8)
WBC # BLD AUTO: 19.2 X10(3) UL (ref 4–13)
WBC # BLD AUTO: 23.6 X10(3) UL (ref 4–13)

## 2018-08-07 PROCEDURE — 85007 BL SMEAR W/DIFF WBC COUNT: CPT | Performed by: OBSTETRICS & GYNECOLOGY

## 2018-08-07 PROCEDURE — 80053 COMPREHEN METABOLIC PANEL: CPT | Performed by: OBSTETRICS & GYNECOLOGY

## 2018-08-07 PROCEDURE — 0KQM0ZZ REPAIR PERINEUM MUSCLE, OPEN APPROACH: ICD-10-PCS | Performed by: OBSTETRICS & GYNECOLOGY

## 2018-08-07 PROCEDURE — 83735 ASSAY OF MAGNESIUM: CPT | Performed by: OBSTETRICS & GYNECOLOGY

## 2018-08-07 PROCEDURE — 88321 CONSLTJ&REPRT SLD PREP ELSWR: CPT | Performed by: OBSTETRICS & GYNECOLOGY

## 2018-08-07 PROCEDURE — 85025 COMPLETE CBC W/AUTO DIFF WBC: CPT | Performed by: OBSTETRICS & GYNECOLOGY

## 2018-08-07 PROCEDURE — 85027 COMPLETE CBC AUTOMATED: CPT | Performed by: OBSTETRICS & GYNECOLOGY

## 2018-08-07 PROCEDURE — 88307 TISSUE EXAM BY PATHOLOGIST: CPT | Performed by: OBSTETRICS & GYNECOLOGY

## 2018-08-07 RX ORDER — ACETAMINOPHEN 500 MG
TABLET ORAL
Status: COMPLETED
Start: 2018-08-07 | End: 2018-08-07

## 2018-08-07 RX ORDER — SODIUM CHLORIDE, SODIUM LACTATE, POTASSIUM CHLORIDE, CALCIUM CHLORIDE 600; 310; 30; 20 MG/100ML; MG/100ML; MG/100ML; MG/100ML
INJECTION, SOLUTION INTRAVENOUS CONTINUOUS
Status: DISCONTINUED | OUTPATIENT
Start: 2018-08-07 | End: 2018-08-08

## 2018-08-07 RX ORDER — MISOPROSTOL 200 UG/1
TABLET ORAL
Status: COMPLETED
Start: 2018-08-07 | End: 2018-08-07

## 2018-08-07 RX ORDER — SIMETHICONE 80 MG
80 TABLET,CHEWABLE ORAL 3 TIMES DAILY PRN
Status: DISCONTINUED | OUTPATIENT
Start: 2018-08-07 | End: 2018-08-09

## 2018-08-07 RX ORDER — DOCUSATE SODIUM 100 MG/1
100 CAPSULE, LIQUID FILLED ORAL
Status: DISCONTINUED | OUTPATIENT
Start: 2018-08-07 | End: 2018-08-09

## 2018-08-07 RX ORDER — CARBOPROST TROMETHAMINE 250 UG/ML
INJECTION, SOLUTION INTRAMUSCULAR
Status: DISCONTINUED
Start: 2018-08-07 | End: 2018-08-07 | Stop reason: WASHOUT

## 2018-08-07 RX ORDER — IBUPROFEN 600 MG/1
600 TABLET ORAL EVERY 6 HOURS
Status: DISCONTINUED | OUTPATIENT
Start: 2018-08-07 | End: 2018-08-09

## 2018-08-07 RX ORDER — BISACODYL 10 MG
10 SUPPOSITORY, RECTAL RECTAL ONCE AS NEEDED
Status: DISCONTINUED | OUTPATIENT
Start: 2018-08-07 | End: 2018-08-09

## 2018-08-07 RX ORDER — ACETAMINOPHEN 325 MG/1
650 TABLET ORAL EVERY 6 HOURS PRN
Status: DISCONTINUED | OUTPATIENT
Start: 2018-08-07 | End: 2018-08-09

## 2018-08-07 RX ORDER — ACETAMINOPHEN 500 MG
1000 TABLET ORAL EVERY 6 HOURS PRN
Status: DISCONTINUED | OUTPATIENT
Start: 2018-08-07 | End: 2018-08-09

## 2018-08-07 NOTE — PROGRESS NOTES
Report given to [facility].  Patient left in stable condition

## 2018-08-07 NOTE — PROGRESS NOTES
Labor Progress Note    Comfortable with epidural.  Temp:  [97.9 °F (36.6 °C)-98.6 °F (37 °C)] 98.4 °F (36.9 °C)  Pulse:  [45-94] 81  Resp:  [16-20] 16  BP: (119-188)/(58-86) 135/72   Patient Vitals for the past 24 hrs:   BP Temp Temp src Pulse Resp SpO2 2222 - - - 77 - 93 %   08/06/18 2220 - - - 75 - 92 %   08/06/18 2217 - - - 73 - 93 %   08/06/18 2215 - - - 72 - 92 %   08/06/18 2212 - - - 73 - 93 %   08/06/18 2207 - - - 70 - 94 %   08/06/18 2200 141/72 - - 72 16 94 %   08/06/18 2157 - - - 73 - 93 %   08/ 1924 (!) 167/82 - - 61 16 100 %   08/06/18 1917 - - - 63 - 100 %   08/06/18 1912 - - - 62 - 99 %   08/06/18 1910 (!) 173/81 - - 59 - -   08/06/18 1907 - - - 61 - 100 %   08/06/18 1902 - - - - - 97 %   08/06/18 1900 - - - 63 - -   08/06/18 1857 - - - 62 - 9

## 2018-08-07 NOTE — L&D DELIVERY NOTE
Thea Jose G Cesar  [[id number]]    Labor Events     labor?:  Yes   steroids?:  Full Course  Cervical ripening date/time:  8/3/2018 192  Cervical ripening type:  Cervidil  Antibiotics received during labor?:  Yes  Rupture date/time:  2018 1430 Cry or active withdrawal    Muscle tone Limp Some flexion Active motion    Respiratory effort Absent Weak cry; hypoventilation Good, crying               1 Minute:   5 Minute:   10 Minute:   15 Minute:   20 Minute:     Skin color:   Heart rate:   Reflex ir

## 2018-08-08 LAB
BASOPHIL % MANUAL: 0 %
BASOPHIL ABSOLUTE MANUAL: 0 X10(3) UL (ref 0–0.1)
EOSINOPHIL % MANUAL: 2 %
EOSINOPHIL ABSOLUTE MANUAL: 0.4 X10(3) UL (ref 0–0.3)
ERYTHROCYTE [DISTWIDTH] IN BLOOD BY AUTOMATED COUNT: 13.6 % (ref 11.5–16)
HCT VFR BLD AUTO: 26.1 % (ref 34–50)
HGB BLD-MCNC: 8.9 G/DL (ref 12–16)
LYMPHOCYTE % MANUAL: 11 %
LYMPHOCYTE ABSOLUTE MANUAL: 2.22 X10(3) UL (ref 0.9–4)
MCH RBC QN AUTO: 32.1 PG (ref 27–33.2)
MCHC RBC AUTO-ENTMCNC: 34.1 G/DL (ref 31–37)
MCV RBC AUTO: 94.2 FL (ref 81–100)
MONOCYTE % MANUAL: 4 %
MONOCYTE ABSOLUTE MANUAL: 0.81 X10(3) UL (ref 0.1–1)
MORPHOLOGY: NORMAL
NEUTROPHIL ABS PRELIM: 15.33 X10 (3) UL (ref 1.3–6.7)
NEUTROPHIL ABSOLUTE MANUAL: 16.77 X10(3) UL (ref 1.3–6.7)
NEUTROPHILS % MANUAL: 83 %
PLATELET # BLD AUTO: 84 10(3)UL (ref 150–450)
PLATELET MORPHOLOGY: NORMAL
RBC # BLD AUTO: 2.77 X10(6)UL (ref 3.8–5.1)
RED CELL DISTRIBUTION WIDTH-SD: 45 FL (ref 35.1–46.3)
TOTAL CELLS COUNTED: 100
WBC # BLD AUTO: 20.2 X10(3) UL (ref 4–13)

## 2018-08-08 PROCEDURE — 85025 COMPLETE CBC W/AUTO DIFF WBC: CPT | Performed by: OBSTETRICS & GYNECOLOGY

## 2018-08-08 PROCEDURE — 85007 BL SMEAR W/DIFF WBC COUNT: CPT | Performed by: OBSTETRICS & GYNECOLOGY

## 2018-08-08 PROCEDURE — 85027 COMPLETE CBC AUTOMATED: CPT | Performed by: OBSTETRICS & GYNECOLOGY

## 2018-08-08 RX ORDER — LABETALOL 200 MG/1
200 TABLET, FILM COATED ORAL EVERY 12 HOURS SCHEDULED
Status: DISCONTINUED | OUTPATIENT
Start: 2018-08-08 | End: 2018-08-09

## 2018-08-08 NOTE — PROGRESS NOTES
Dr Dawood Lucas at RN station, notified of pt epidural site.  Order recvd to assess pt ability to move lower extremities every two hours

## 2018-08-08 NOTE — PROGRESS NOTES
Postpartum Progress Note    SUBJECTIVE:  Postpartum day #1 s/p     No overnight events. Patient doing well without complaints. Ambulating, tolerating PO, voiding, pain well controlled. Patient with HELLP syndrome now on magnesium.   Plan to stop thi BPs borderline, add labetalol 200mg PO bid    Cecille Lugo MD  8/8/2018  8:48 AM

## 2018-08-08 NOTE — LACTATION NOTE
Brief visit with parents and baby is at bedside. Discussion of feeding behaviors of late  infant as well as usual effects of magnesium sulfate therapy.     Provided information sheet on LPTI and discussed initiation of pumping with patient and her hu

## 2018-08-08 NOTE — PROGRESS NOTES
Sat pt up to get out of bed, noticed band aid from epidural cath  was saturated with bright red blood and there was bright red blood on the pt sheet. Band aid removed, no further bleeding.  pt states no pain to area but just \"soreness\", no bruising, no wa

## 2018-08-08 NOTE — PROGRESS NOTES
Anesthesiology         Pt is POD#1 s/p C/S under spinal with IT Duramorph for postop analgesia. She denies any backache, pruritis or N/V. Pain is well controlled with IT Duramorph. Ms. Fariha Rodrigues does report some mild bifrontal HA.   No diploplia an

## 2018-08-09 VITALS
DIASTOLIC BLOOD PRESSURE: 66 MMHG | HEART RATE: 66 BPM | RESPIRATION RATE: 16 BRPM | SYSTOLIC BLOOD PRESSURE: 138 MMHG | HEIGHT: 67 IN | OXYGEN SATURATION: 100 % | TEMPERATURE: 99 F | BODY MASS INDEX: 32.96 KG/M2 | WEIGHT: 210 LBS

## 2018-08-09 PROBLEM — Z34.00 SUPERVISION OF NORMAL FIRST PREGNANCY: Status: RESOLVED | Noted: 2018-01-19 | Resolved: 2018-08-09

## 2018-08-09 PROBLEM — O99.820 GROUP B STREPTOCOCCUS CARRIER, +RV CULTURE, CURRENTLY PREGNANT (HCC): Status: RESOLVED | Noted: 2018-08-06 | Resolved: 2018-08-09

## 2018-08-09 PROBLEM — Z34.90 PREGNANCY (HCC): Status: RESOLVED | Noted: 2018-08-03 | Resolved: 2018-08-09

## 2018-08-09 PROBLEM — Z34.00 SUPERVISION OF NORMAL FIRST PREGNANCY (HCC): Status: RESOLVED | Noted: 2018-01-19 | Resolved: 2018-08-09

## 2018-08-09 PROBLEM — O99.820 GROUP B STREPTOCOCCUS CARRIER, +RV CULTURE, CURRENTLY PREGNANT: Status: RESOLVED | Noted: 2018-08-06 | Resolved: 2018-08-09

## 2018-08-09 PROBLEM — Z34.90 PREGNANCY: Status: RESOLVED | Noted: 2018-08-03 | Resolved: 2018-08-09

## 2018-08-09 RX ORDER — LABETALOL 100 MG/1
100 TABLET, FILM COATED ORAL 2 TIMES DAILY
Qty: 60 TABLET | Refills: 1 | Status: SHIPPED | OUTPATIENT
Start: 2018-08-09 | End: 2019-08-09

## 2018-08-09 NOTE — PROGRESS NOTES
S: pt without complaints.   Lochia light  O: VS-Temp:  [98.5 °F (36.9 °C)] 98.5 °F (36.9 °C)  Pulse:  [57-76] 57  Resp:  [16] 16  BP: (128-152)/(67-85) 129/68       Abdomen- soft ND NT, uterus firm and contracted       Extremities- 1+ edema, NT bilateral lo

## 2018-08-11 ENCOUNTER — HOSPITAL ENCOUNTER (EMERGENCY)
Facility: HOSPITAL | Age: 33
Discharge: HOME | End: 2018-08-11
Attending: EMERGENCY MEDICINE
Payer: COMMERCIAL

## 2018-08-11 ENCOUNTER — APPOINTMENT (OUTPATIENT)
Dept: GENERAL RADIOLOGY | Facility: HOSPITAL | Age: 33
End: 2018-08-11
Attending: EMERGENCY MEDICINE
Payer: COMMERCIAL

## 2018-08-11 VITALS
TEMPERATURE: 98 F | OXYGEN SATURATION: 99 % | DIASTOLIC BLOOD PRESSURE: 80 MMHG | HEART RATE: 52 BPM | BODY MASS INDEX: 29.82 KG/M2 | HEIGHT: 67 IN | RESPIRATION RATE: 14 BRPM | WEIGHT: 190 LBS | SYSTOLIC BLOOD PRESSURE: 149 MMHG

## 2018-08-11 DIAGNOSIS — R60.0 EXTREMITY EDEMA: Primary | ICD-10-CM

## 2018-08-11 DIAGNOSIS — R06.01 ORTHOPNEA: ICD-10-CM

## 2018-08-11 LAB
ALBUMIN SERPL-MCNC: 2.7 G/DL (ref 3.5–4.8)
ALBUMIN/GLOB SERPL: 0.8 {RATIO} (ref 1–2)
ALP LIVER SERPL-CCNC: 131 U/L (ref 37–98)
ALT SERPL-CCNC: 118 U/L (ref 14–54)
ANION GAP SERPL CALC-SCNC: 6 MMOL/L (ref 0–18)
AST SERPL-CCNC: 131 U/L (ref 15–41)
BASOPHILS # BLD AUTO: 0.06 X10(3) UL (ref 0–0.1)
BASOPHILS NFR BLD AUTO: 0.6 %
BILIRUB SERPL-MCNC: 0.6 MG/DL (ref 0.1–2)
BILIRUB UR QL STRIP.AUTO: NEGATIVE
BUN BLD-MCNC: 11 MG/DL (ref 8–20)
BUN/CREAT SERPL: 13.8 (ref 10–20)
CALCIUM BLD-MCNC: 8.4 MG/DL (ref 8.3–10.3)
CHLORIDE SERPL-SCNC: 108 MMOL/L (ref 101–111)
CLARITY UR REFRACT.AUTO: CLEAR
CO2 SERPL-SCNC: 27 MMOL/L (ref 22–32)
CREAT BLD-MCNC: 0.8 MG/DL (ref 0.55–1.02)
EOSINOPHIL # BLD AUTO: 0.42 X10(3) UL (ref 0–0.3)
EOSINOPHIL NFR BLD AUTO: 4.1 %
ERYTHROCYTE [DISTWIDTH] IN BLOOD BY AUTOMATED COUNT: 14.1 % (ref 11.5–16)
GLOBULIN PLAS-MCNC: 3.4 G/DL (ref 2.5–3.7)
GLUCOSE BLD-MCNC: 74 MG/DL (ref 70–99)
GLUCOSE UR STRIP.AUTO-MCNC: NEGATIVE MG/DL
HCT VFR BLD AUTO: 27.2 % (ref 34–50)
HGB BLD-MCNC: 9 G/DL (ref 12–16)
IMMATURE GRANULOCYTE COUNT: 0.45 X10(3) UL (ref 0–1)
IMMATURE GRANULOCYTE RATIO %: 4.4 %
KETONES UR STRIP.AUTO-MCNC: NEGATIVE MG/DL
LEUKOCYTE ESTERASE UR QL STRIP.AUTO: NEGATIVE
LIPASE: 94 U/L (ref 73–393)
LYMPHOCYTES # BLD AUTO: 2.05 X10(3) UL (ref 0.9–4)
LYMPHOCYTES NFR BLD AUTO: 20.1 %
M PROTEIN MFR SERPL ELPH: 6.1 G/DL (ref 6.1–8.3)
MCH RBC QN AUTO: 33 PG (ref 27–33.2)
MCHC RBC AUTO-ENTMCNC: 33.1 G/DL (ref 31–37)
MCV RBC AUTO: 99.6 FL (ref 81–100)
MONOCYTES # BLD AUTO: 0.82 X10(3) UL (ref 0.1–1)
MONOCYTES NFR BLD AUTO: 8 %
NEUTROPHIL ABS PRELIM: 6.42 X10 (3) UL (ref 1.3–6.7)
NEUTROPHILS # BLD AUTO: 6.42 X10(3) UL (ref 1.3–6.7)
NEUTROPHILS NFR BLD AUTO: 62.8 %
NITRITE UR QL STRIP.AUTO: NEGATIVE
OSMOLALITY SERPL CALC.SUM OF ELEC: 290 MOSM/KG (ref 275–295)
PH UR STRIP.AUTO: 7 [PH] (ref 4.5–8)
PLATELET # BLD AUTO: 157 10(3)UL (ref 150–450)
POTASSIUM SERPL-SCNC: 3.9 MMOL/L (ref 3.6–5.1)
PRO-BETA NATRIURETIC PEPTIDE: 884 PG/ML (ref ?–125)
PROT UR STRIP.AUTO-MCNC: NEGATIVE MG/DL
RBC # BLD AUTO: 2.73 X10(6)UL (ref 3.8–5.1)
RED CELL DISTRIBUTION WIDTH-SD: 49 FL (ref 35.1–46.3)
SODIUM SERPL-SCNC: 141 MMOL/L (ref 136–144)
SP GR UR STRIP.AUTO: 1.01 (ref 1–1.03)
URATE SERPL-MCNC: 6.6 MG/DL (ref 2.4–8)
UROBILINOGEN UR STRIP.AUTO-MCNC: <2 MG/DL
WBC # BLD AUTO: 10.2 X10(3) UL (ref 4–13)

## 2018-08-11 PROCEDURE — 93005 ELECTROCARDIOGRAM TRACING: CPT

## 2018-08-11 PROCEDURE — 87077 CULTURE AEROBIC IDENTIFY: CPT | Performed by: EMERGENCY MEDICINE

## 2018-08-11 PROCEDURE — 87186 SC STD MICRODIL/AGAR DIL: CPT | Performed by: EMERGENCY MEDICINE

## 2018-08-11 PROCEDURE — 99285 EMERGENCY DEPT VISIT HI MDM: CPT

## 2018-08-11 PROCEDURE — 83690 ASSAY OF LIPASE: CPT | Performed by: EMERGENCY MEDICINE

## 2018-08-11 PROCEDURE — 81001 URINALYSIS AUTO W/SCOPE: CPT | Performed by: EMERGENCY MEDICINE

## 2018-08-11 PROCEDURE — 71045 X-RAY EXAM CHEST 1 VIEW: CPT | Performed by: EMERGENCY MEDICINE

## 2018-08-11 PROCEDURE — 85025 COMPLETE CBC W/AUTO DIFF WBC: CPT | Performed by: EMERGENCY MEDICINE

## 2018-08-11 PROCEDURE — 80053 COMPREHEN METABOLIC PANEL: CPT | Performed by: EMERGENCY MEDICINE

## 2018-08-11 PROCEDURE — 87086 URINE CULTURE/COLONY COUNT: CPT | Performed by: EMERGENCY MEDICINE

## 2018-08-11 PROCEDURE — 93010 ELECTROCARDIOGRAM REPORT: CPT

## 2018-08-11 PROCEDURE — 96374 THER/PROPH/DIAG INJ IV PUSH: CPT

## 2018-08-11 PROCEDURE — 83880 ASSAY OF NATRIURETIC PEPTIDE: CPT | Performed by: EMERGENCY MEDICINE

## 2018-08-11 PROCEDURE — 84550 ASSAY OF BLOOD/URIC ACID: CPT | Performed by: EMERGENCY MEDICINE

## 2018-08-11 RX ORDER — FUROSEMIDE 10 MG/ML
40 INJECTION INTRAMUSCULAR; INTRAVENOUS ONCE
Status: COMPLETED | OUTPATIENT
Start: 2018-08-11 | End: 2018-08-11

## 2018-08-11 NOTE — ED PROVIDER NOTES
Patient Seen in: [facility] Emergency Department    History   Patient presents with:  Dyspnea NOHEMI SOB (respiratory)    Stated Complaint: NOHEMI    HPI    Patient presents with shortness of breath.   The patient is 4 days postpartum from a vaginal delive Exam    General: Alert and oriented x3 in no acute distress. HEENT: Normocephalic, atraumatic, pupils equal round and reactive to light, oropharynx clear, uvula midline. Neck: Supple. Cardiovascular: Regular rate and rhythm, no murmurs.   Respiratory: Lylia Backbone RAINBOW DRAW GOLD   URINE CULTURE, ROUTINE     EKG    Rate, intervals and axes as noted on EKG Report.   Rate: 53  Rhythm: Sinus bradycardia  Reading: Low voltage QRS, no ST abnormality         Xr Chest Ap Portable  (cpt=71045)    Result Date: 8/11/2018 encounter diagnosis)  Orthopnea    Disposition:  Discharge  8/11/2018  1:50 pm    Follow-up:  Edman Lundborg, MD  [place] [id number]  [phone]          Meg Silverio [address] [facility] 129

## 2018-08-11 NOTE — ED INITIAL ASSESSMENT (HPI)
Pt here 4 days postpartum vaginal delivery . +pre-elampsia. Yesterday developed sob pointing to her epigastric area stating she feels like she did a lot of situps. Increased sob with lying down.

## 2018-08-12 LAB
ATRIAL RATE: 53 BPM
P AXIS: -3 DEGREES
P-R INTERVAL: 126 MS
Q-T INTERVAL: 432 MS
QRS DURATION: 66 MS
QTC CALCULATION (BEZET): 405 MS
R AXIS: 26 DEGREES
T AXIS: 31 DEGREES
VENTRICULAR RATE: 53 BPM

## 2018-08-13 RX ORDER — NITROFURANTOIN 25; 75 MG/1; MG/1
100 CAPSULE ORAL 2 TIMES DAILY
Qty: 20 CAPSULE | Refills: 0 | Status: SHIPPED | OUTPATIENT
Start: 2018-08-13 | End: 2018-08-23

## 2018-08-14 ENCOUNTER — TELEPHONE (OUTPATIENT)
Dept: OBGYN UNIT | Facility: HOSPITAL | Age: 33
End: 2018-08-14

## 2018-08-14 NOTE — PROGRESS NOTES
Reviewed self and infant care w / mom, she verbalizes understanding of instructions reviewed. Encourage to follow up w/ MDs as directed and w/ questions/concerns. Reviewed all sx of PIALBANIA, on meds now, enc to go to ct.

## 2018-09-18 PROCEDURE — 87086 URINE CULTURE/COLONY COUNT: CPT | Performed by: OBSTETRICS & GYNECOLOGY

## 2021-03-09 PROBLEM — O09.529 ANTEPARTUM MULTIGRAVIDA OF ADVANCED MATERNAL AGE (HCC): Status: ACTIVE | Noted: 2021-03-09

## 2021-03-09 PROBLEM — O09.529 ANTEPARTUM MULTIGRAVIDA OF ADVANCED MATERNAL AGE: Status: ACTIVE | Noted: 2021-03-09

## 2021-03-14 PROBLEM — Z87.51 HISTORY OF PRETERM DELIVERY: Status: ACTIVE | Noted: 2021-03-14

## 2021-04-06 PROBLEM — O09.299 HISTORY OF PRE-ECLAMPSIA IN PRIOR PREGNANCY, CURRENTLY PREGNANT: Status: ACTIVE | Noted: 2021-04-06

## 2021-04-06 PROBLEM — O09.299 HISTORY OF PRE-ECLAMPSIA IN PRIOR PREGNANCY, CURRENTLY PREGNANT (HCC): Status: ACTIVE | Noted: 2021-04-06

## 2021-04-18 ENCOUNTER — HOSPITAL ENCOUNTER (EMERGENCY)
Facility: HOSPITAL | Age: 36
Discharge: HOME | End: 2021-04-18
Attending: EMERGENCY MEDICINE
Payer: COMMERCIAL

## 2021-04-18 VITALS
HEIGHT: 67 IN | HEART RATE: 94 BPM | BODY MASS INDEX: 28.25 KG/M2 | OXYGEN SATURATION: 98 % | WEIGHT: 180 LBS | DIASTOLIC BLOOD PRESSURE: 66 MMHG | SYSTOLIC BLOOD PRESSURE: 108 MMHG | TEMPERATURE: 98 F | RESPIRATION RATE: 20 BRPM

## 2021-04-18 DIAGNOSIS — N76.4 LABIAL ABSCESS: Primary | ICD-10-CM

## 2021-04-18 PROCEDURE — 99283 EMERGENCY DEPT VISIT LOW MDM: CPT

## 2021-04-18 PROCEDURE — 87205 SMEAR GRAM STAIN: CPT | Performed by: EMERGENCY MEDICINE

## 2021-04-18 PROCEDURE — 56405 I&D VULVA/PERINEAL ABSCESS: CPT

## 2021-04-18 PROCEDURE — 96372 THER/PROPH/DIAG INJ SC/IM: CPT

## 2021-04-18 PROCEDURE — 87077 CULTURE AEROBIC IDENTIFY: CPT | Performed by: EMERGENCY MEDICINE

## 2021-04-18 PROCEDURE — 87070 CULTURE OTHR SPECIMN AEROBIC: CPT | Performed by: EMERGENCY MEDICINE

## 2021-04-18 RX ORDER — CLINDAMYCIN HYDROCHLORIDE 150 MG/1
150 CAPSULE ORAL 3 TIMES DAILY
Qty: 15 CAPSULE | Refills: 0 | Status: SHIPPED | OUTPATIENT
Start: 2021-04-18 | End: 2021-04-23

## 2021-04-18 RX ORDER — MORPHINE SULFATE 4 MG/ML
6 INJECTION, SOLUTION INTRAMUSCULAR; INTRAVENOUS ONCE
Status: COMPLETED | OUTPATIENT
Start: 2021-04-18 | End: 2021-04-18

## 2021-04-18 RX ORDER — ACETAMINOPHEN 500 MG
1000 TABLET ORAL ONCE
Status: COMPLETED | OUTPATIENT
Start: 2021-04-18 | End: 2021-04-18

## 2021-04-18 NOTE — ED INITIAL ASSESSMENT (HPI)
Patient reports abscess developing to right labia since Monday, using some hot packs and baths at home. No fever, not draining. 17 weeks pregnant. Taking tylenol at home, none today. Was seen at urgent care yesterday and prescribed keflex.  Had has 4 doses

## 2021-04-18 NOTE — ED PROVIDER NOTES
Patient Seen in: [facility] Emergency Department      History   Patient presents with:  Abscess    Stated Complaint: 17 weeks pregnant, perineal abscess    HPI/Subjective:   HPI    67-year-old G2, P1 presents for evaluation of an abscess in her righ complex fluid collection measuring 2.2 x 2.8 x 4.0 cm. Correlate clinically for abscess or hematoma. Impression   IMPRESSION:   Complex fluid collection measuring up to 4.0 cm in the right labial base. Correlate clinically for   abscess or hematoma.

## 2021-05-10 ENCOUNTER — OFFICE VISIT (OUTPATIENT)
Dept: PERINATAL CARE | Facility: HOSPITAL | Age: 36
End: 2021-05-10
Attending: OBSTETRICS & GYNECOLOGY
Payer: COMMERCIAL

## 2021-05-10 VITALS
WEIGHT: 186 LBS | SYSTOLIC BLOOD PRESSURE: 110 MMHG | DIASTOLIC BLOOD PRESSURE: 75 MMHG | HEART RATE: 109 BPM | BODY MASS INDEX: 29.19 KG/M2 | HEIGHT: 67 IN

## 2021-05-10 DIAGNOSIS — O09.529 ANTEPARTUM MULTIGRAVIDA OF ADVANCED MATERNAL AGE: ICD-10-CM

## 2021-05-10 DIAGNOSIS — O09.529 ANTEPARTUM MULTIGRAVIDA OF ADVANCED MATERNAL AGE: Primary | ICD-10-CM

## 2021-05-10 DIAGNOSIS — O09.299 HISTORY OF PRE-ECLAMPSIA IN PRIOR PREGNANCY, CURRENTLY PREGNANT: ICD-10-CM

## 2021-05-10 PROCEDURE — 76811 OB US DETAILED SNGL FETUS: CPT | Performed by: OBSTETRICS & GYNECOLOGY

## 2021-05-10 PROCEDURE — 99244 OFF/OP CNSLTJ NEW/EST MOD 40: CPT | Performed by: OBSTETRICS & GYNECOLOGY

## 2021-05-10 RX ORDER — ASPIRIN 81 MG/1
120 TABLET ORAL DAILY
COMMUNITY
End: 2021-09-21

## 2021-05-10 NOTE — PROGRESS NOTES
Reason for Consult:   Dear Dr. Fabiano Martínez,    Thank you for requesting ultrasound evaluation and maternal fetal medicine consultation on Lisa [address] [facility]. As you are aware she is a 29year old female with a [place] pregnancy at 22w3d.   A maternal-fetal m Arthritis Maternal Grandmother         RA   • Allergies Sister    • Heart Disease Maternal Grandfather    • Cancer Paternal Grandfather         Pancreatic   • Hypertension Maternal Aunt    • High Cholesterol Maternal Aunt    • Diabetes Maternal Aunt    • O advised. Medical Complications    Women 28years of age or older can expect to experience two to three fold higher rates of hospitalization,  delivery, and pregnancy-related complications when compared to their younger counterparts.   The two mos diaphragmatic hernia appear to occur with increased frequency in offspring of older women. These abnormalities are structural and unrelated to aneuploidy, thus they would not be detected by karyotype analysis.   For these reasons a complete, detailed ultras all pregnancies worldwide, and about 5 to 8 percent in the [place].   The pathogenesis of preeclampsia is incompletely understood, but the disorder is clearly initiated by the presence of the trophoblast, and impaired placental angiogenesis plays an i Therefore, screening pregnant women for inherited thrombophilias is not useful for predicting those at high risk of developing preeclampsia.   Antiphospholipid antibody syndrome is associated with development of severe early preeclampsia and screening is re alternative. No drug prevents progression to more severe disease; use of any drug in this setting is not recommended.                    OB ULTRASOUND REPORT   See imaging tab for complete ultrasound report or in PACS    Single IUP in cephalic presentatio coordination of care. Greater than 50% of this time was spent in face to face discussion with the patient.

## 2021-08-10 ENCOUNTER — HOSPITAL ENCOUNTER (EMERGENCY)
Facility: HOSPITAL | Age: 36
Discharge: HOME | End: 2021-08-10
Attending: EMERGENCY MEDICINE
Payer: COMMERCIAL

## 2021-08-10 ENCOUNTER — APPOINTMENT (OUTPATIENT)
Dept: ULTRASOUND IMAGING | Facility: HOSPITAL | Age: 36
End: 2021-08-10
Attending: EMERGENCY MEDICINE
Payer: COMMERCIAL

## 2021-08-10 VITALS
HEIGHT: 67 IN | WEIGHT: 200 LBS | DIASTOLIC BLOOD PRESSURE: 66 MMHG | BODY MASS INDEX: 31.39 KG/M2 | SYSTOLIC BLOOD PRESSURE: 110 MMHG | TEMPERATURE: 97 F | OXYGEN SATURATION: 100 % | HEART RATE: 64 BPM | RESPIRATION RATE: 16 BRPM

## 2021-08-10 DIAGNOSIS — Z34.93 THIRD TRIMESTER PREGNANCY: ICD-10-CM

## 2021-08-10 DIAGNOSIS — R60.0 LEG EDEMA, RIGHT: Primary | ICD-10-CM

## 2021-08-10 LAB
ALBUMIN SERPL-MCNC: 2.5 G/DL (ref 3.4–5)
ALBUMIN/GLOB SERPL: 0.7 {RATIO} (ref 1–2)
ALP LIVER SERPL-CCNC: 96 U/L
ALT SERPL-CCNC: 14 U/L
ANION GAP SERPL CALC-SCNC: 3 MMOL/L (ref 0–18)
AST SERPL-CCNC: 16 U/L (ref 15–37)
BASOPHILS # BLD AUTO: 0.03 X10(3) UL (ref 0–0.2)
BASOPHILS NFR BLD AUTO: 0.3 %
BILIRUB SERPL-MCNC: 0.4 MG/DL (ref 0.1–2)
BUN BLD-MCNC: 6 MG/DL (ref 7–18)
CALCIUM BLD-MCNC: 8.1 MG/DL (ref 8.5–10.1)
CHLORIDE SERPL-SCNC: 109 MMOL/L (ref 98–112)
CO2 SERPL-SCNC: 28 MMOL/L (ref 21–32)
CREAT BLD-MCNC: 0.82 MG/DL
EOSINOPHIL # BLD AUTO: 0.09 X10(3) UL (ref 0–0.7)
EOSINOPHIL NFR BLD AUTO: 0.9 %
ERYTHROCYTE [DISTWIDTH] IN BLOOD BY AUTOMATED COUNT: 12.4 %
GLOBULIN PLAS-MCNC: 3.8 G/DL (ref 2.8–4.4)
GLUCOSE BLD-MCNC: 84 MG/DL (ref 70–99)
HCT VFR BLD AUTO: 32.8 %
HGB BLD-MCNC: 11.6 G/DL
IMM GRANULOCYTES # BLD AUTO: 0.04 X10(3) UL (ref 0–1)
IMM GRANULOCYTES NFR BLD: 0.4 %
LYMPHOCYTES # BLD AUTO: 2.4 X10(3) UL (ref 1–4)
LYMPHOCYTES NFR BLD AUTO: 23.9 %
M PROTEIN MFR SERPL ELPH: 6.3 G/DL (ref 6.4–8.2)
MCH RBC QN AUTO: 33 PG (ref 26–34)
MCHC RBC AUTO-ENTMCNC: 35.4 G/DL (ref 31–37)
MCV RBC AUTO: 93.4 FL
MONOCYTES # BLD AUTO: 0.87 X10(3) UL (ref 0.1–1)
MONOCYTES NFR BLD AUTO: 8.7 %
NEUTROPHILS # BLD AUTO: 6.62 X10 (3) UL (ref 1.5–7.7)
NEUTROPHILS # BLD AUTO: 6.62 X10(3) UL (ref 1.5–7.7)
NEUTROPHILS NFR BLD AUTO: 65.8 %
OSMOLALITY SERPL CALC.SUM OF ELEC: 287 MOSM/KG (ref 275–295)
PLATELET # BLD AUTO: 165 10(3)UL (ref 150–450)
POTASSIUM SERPL-SCNC: 3.7 MMOL/L (ref 3.5–5.1)
RBC # BLD AUTO: 3.51 X10(6)UL
SODIUM SERPL-SCNC: 140 MMOL/L (ref 136–145)
WBC # BLD AUTO: 10.1 X10(3) UL (ref 4–11)

## 2021-08-10 PROCEDURE — 80053 COMPREHEN METABOLIC PANEL: CPT | Performed by: EMERGENCY MEDICINE

## 2021-08-10 PROCEDURE — 99284 EMERGENCY DEPT VISIT MOD MDM: CPT

## 2021-08-10 PROCEDURE — 85025 COMPLETE CBC W/AUTO DIFF WBC: CPT | Performed by: EMERGENCY MEDICINE

## 2021-08-10 PROCEDURE — 93971 EXTREMITY STUDY: CPT | Performed by: EMERGENCY MEDICINE

## 2021-08-10 PROCEDURE — 36415 COLL VENOUS BLD VENIPUNCTURE: CPT

## 2021-08-11 NOTE — ED PROVIDER NOTES
Patient Seen in: [facility] Emergency Department      History   Patient presents with:  Deep Vein Thrombosis    Stated Complaint: r/o dvt R foot swelling     HPI/Subjective:   HPI    Patient is a pleasant 43-year-old gravid female,  @ 33weeks by in no apparent distress. HEENT: Head is without evidence of trauma. Extraocular muscles are intact. Pupils are equal and reactive to light. NECK: Neck is supple. The trachea is midline. LUNGS: Lungs are clear, respirations are unlabored.    HEART: Reg vascular structures, Doppler spectral analysis, and color flow. Doppler imaging were performed.   The following veins were imaged:  Common, deep, and superficial femoral, popliteal, sapheno-femoral junction, posterior tibial veins, and the contralateral co 8/10/2021 11:05 PM

## 2021-08-11 NOTE — ED INITIAL ASSESSMENT (HPI)
PT TO ED FROM RIGHT ANKLE SWELLING THAT SHE NOTICED APPROX. 1 HOUR AGO, DENIES PAIN OR SOB. PT 33 WEEKS PREGNANT. PT CALLED DR Marya Seo AND ADVISED PT TO FOLLOW UP WITH ER FOR FURTHER EVAL

## 2021-09-19 ENCOUNTER — ANESTHESIA (OUTPATIENT)
Dept: OBGYN UNIT | Facility: HOSPITAL | Age: 36
End: 2021-09-19
Payer: COMMERCIAL

## 2021-09-19 ENCOUNTER — ANESTHESIA EVENT (OUTPATIENT)
Dept: OBGYN UNIT | Facility: HOSPITAL | Age: 36
End: 2021-09-19
Payer: COMMERCIAL

## 2021-09-19 ENCOUNTER — HOSPITAL ENCOUNTER (INPATIENT)
Facility: HOSPITAL | Age: 36
LOS: 2 days | Discharge: HOME | End: 2021-09-21
Attending: OBSTETRICS & GYNECOLOGY | Admitting: OBSTETRICS & GYNECOLOGY
Payer: COMMERCIAL

## 2021-09-19 PROBLEM — Z34.90 PREGNANCY: Status: ACTIVE | Noted: 2021-09-19

## 2021-09-19 PROBLEM — Z34.90 PREGNANCY (HCC): Status: ACTIVE | Noted: 2021-09-19

## 2021-09-19 LAB
ANTIBODY SCREEN: NEGATIVE
BASOPHILS # BLD AUTO: 0.04 X10(3) UL (ref 0–0.2)
BASOPHILS NFR BLD AUTO: 0.4 %
EOSINOPHIL # BLD AUTO: 0.07 X10(3) UL (ref 0–0.7)
EOSINOPHIL NFR BLD AUTO: 0.7 %
ERYTHROCYTE [DISTWIDTH] IN BLOOD BY AUTOMATED COUNT: 12.3 %
HCT VFR BLD AUTO: 34.4 %
HGB BLD-MCNC: 12 G/DL
IMM GRANULOCYTES # BLD AUTO: 0.03 X10(3) UL (ref 0–1)
IMM GRANULOCYTES NFR BLD: 0.3 %
LYMPHOCYTES # BLD AUTO: 2.09 X10(3) UL (ref 1–4)
LYMPHOCYTES NFR BLD AUTO: 21.7 %
MCH RBC QN AUTO: 33 PG (ref 26–34)
MCHC RBC AUTO-ENTMCNC: 34.9 G/DL (ref 31–37)
MCV RBC AUTO: 94.5 FL
MONOCYTES # BLD AUTO: 0.93 X10(3) UL (ref 0.1–1)
MONOCYTES NFR BLD AUTO: 9.7 %
NEUTROPHILS # BLD AUTO: 6.46 X10 (3) UL (ref 1.5–7.7)
NEUTROPHILS # BLD AUTO: 6.46 X10(3) UL (ref 1.5–7.7)
NEUTROPHILS NFR BLD AUTO: 67.2 %
PLATELET # BLD AUTO: 164 10(3)UL (ref 150–450)
RBC # BLD AUTO: 3.64 X10(6)UL
RH BLOOD TYPE: POSITIVE
RUPTURE OF MEMBRANE (ROM): POSITIVE
SARS-COV-2 RNA RESP QL NAA+PROBE: NOT DETECTED
T PALLIDUM AB SER QL IA: NONREACTIVE
WBC # BLD AUTO: 9.6 X10(3) UL (ref 4–11)

## 2021-09-19 PROCEDURE — 86780 TREPONEMA PALLIDUM: CPT | Performed by: OBSTETRICS & GYNECOLOGY

## 2021-09-19 PROCEDURE — 86901 BLOOD TYPING SEROLOGIC RH(D): CPT | Performed by: OBSTETRICS & GYNECOLOGY

## 2021-09-19 PROCEDURE — 86900 BLOOD TYPING SEROLOGIC ABO: CPT | Performed by: OBSTETRICS & GYNECOLOGY

## 2021-09-19 PROCEDURE — 84112 EVAL AMNIOTIC FLUID PROTEIN: CPT | Performed by: OBSTETRICS & GYNECOLOGY

## 2021-09-19 PROCEDURE — 86850 RBC ANTIBODY SCREEN: CPT | Performed by: OBSTETRICS & GYNECOLOGY

## 2021-09-19 PROCEDURE — 99214 OFFICE O/P EST MOD 30 MIN: CPT

## 2021-09-19 PROCEDURE — 85025 COMPLETE CBC W/AUTO DIFF WBC: CPT | Performed by: OBSTETRICS & GYNECOLOGY

## 2021-09-19 RX ORDER — TRISODIUM CITRATE DIHYDRATE AND CITRIC ACID MONOHYDRATE 500; 334 MG/5ML; MG/5ML
30 SOLUTION ORAL AS NEEDED
Status: DISCONTINUED | OUTPATIENT
Start: 2021-09-19 | End: 2021-09-20

## 2021-09-19 RX ORDER — NALBUPHINE HCL 10 MG/ML
2.5 AMPUL (ML) INJECTION
Status: DISCONTINUED | OUTPATIENT
Start: 2021-09-19 | End: 2021-09-20

## 2021-09-19 RX ORDER — ACETAMINOPHEN 500 MG
500 TABLET ORAL EVERY 6 HOURS PRN
Status: DISCONTINUED | OUTPATIENT
Start: 2021-09-19 | End: 2021-09-20

## 2021-09-19 RX ORDER — IBUPROFEN 600 MG/1
600 TABLET ORAL EVERY 6 HOURS PRN
Status: DISCONTINUED | OUTPATIENT
Start: 2021-09-19 | End: 2021-09-20

## 2021-09-19 RX ORDER — AMMONIA INHALANTS 0.04 G/.3ML
0.3 INHALANT RESPIRATORY (INHALATION) AS NEEDED
Status: DISCONTINUED | OUTPATIENT
Start: 2021-09-19 | End: 2021-09-20

## 2021-09-19 RX ORDER — DEXTROSE, SODIUM CHLORIDE, SODIUM LACTATE, POTASSIUM CHLORIDE, AND CALCIUM CHLORIDE 5; .6; .31; .03; .02 G/100ML; G/100ML; G/100ML; G/100ML; G/100ML
INJECTION, SOLUTION INTRAVENOUS AS NEEDED
Status: DISCONTINUED | OUTPATIENT
Start: 2021-09-19 | End: 2021-09-20

## 2021-09-19 RX ORDER — CALCIUM CARBONATE 200(500)MG
1000 TABLET,CHEWABLE ORAL
Status: DISCONTINUED | OUTPATIENT
Start: 2021-09-19 | End: 2021-09-20

## 2021-09-19 RX ORDER — LIDOCAINE HYDROCHLORIDE AND EPINEPHRINE 15; 5 MG/ML; UG/ML
INJECTION, SOLUTION EPIDURAL AS NEEDED
Status: DISCONTINUED | OUTPATIENT
Start: 2021-09-19 | End: 2021-09-20 | Stop reason: SURG

## 2021-09-19 RX ORDER — SODIUM CHLORIDE, SODIUM LACTATE, POTASSIUM CHLORIDE, CALCIUM CHLORIDE 600; 310; 30; 20 MG/100ML; MG/100ML; MG/100ML; MG/100ML
INJECTION, SOLUTION INTRAVENOUS CONTINUOUS
Status: DISCONTINUED | OUTPATIENT
Start: 2021-09-19 | End: 2021-09-20

## 2021-09-19 RX ORDER — BUPIVACAINE HCL/0.9 % NACL/PF 0.25 %
5 PLASTIC BAG, INJECTION (ML) EPIDURAL AS NEEDED
Status: DISCONTINUED | OUTPATIENT
Start: 2021-09-19 | End: 2021-09-20

## 2021-09-19 RX ORDER — TERBUTALINE SULFATE 1 MG/ML
0.25 INJECTION, SOLUTION SUBCUTANEOUS AS NEEDED
Status: DISCONTINUED | OUTPATIENT
Start: 2021-09-19 | End: 2021-09-20

## 2021-09-19 RX ORDER — ONDANSETRON 2 MG/ML
4 INJECTION INTRAMUSCULAR; INTRAVENOUS EVERY 6 HOURS PRN
Status: DISCONTINUED | OUTPATIENT
Start: 2021-09-19 | End: 2021-09-20

## 2021-09-19 RX ADMIN — LIDOCAINE HYDROCHLORIDE AND EPINEPHRINE 5 ML: 15; 5 INJECTION, SOLUTION EPIDURAL at 21:32:00

## 2021-09-20 PROCEDURE — 0KQM0ZZ REPAIR PERINEUM MUSCLE, OPEN APPROACH: ICD-10-PCS | Performed by: OBSTETRICS & GYNECOLOGY

## 2021-09-20 RX ORDER — ACETAMINOPHEN 325 MG/1
650 TABLET ORAL EVERY 6 HOURS PRN
Status: DISCONTINUED | OUTPATIENT
Start: 2021-09-20 | End: 2021-09-21

## 2021-09-20 RX ORDER — SIMETHICONE 80 MG
80 TABLET,CHEWABLE ORAL 3 TIMES DAILY PRN
Status: DISCONTINUED | OUTPATIENT
Start: 2021-09-20 | End: 2021-09-21

## 2021-09-20 RX ORDER — IBUPROFEN 600 MG/1
600 TABLET ORAL EVERY 6 HOURS
Status: DISCONTINUED | OUTPATIENT
Start: 2021-09-20 | End: 2021-09-21

## 2021-09-20 RX ORDER — BISACODYL 10 MG
10 SUPPOSITORY, RECTAL RECTAL ONCE AS NEEDED
Status: DISCONTINUED | OUTPATIENT
Start: 2021-09-20 | End: 2021-09-21

## 2021-09-20 RX ORDER — DOCUSATE SODIUM 100 MG/1
100 CAPSULE, LIQUID FILLED ORAL
Status: DISCONTINUED | OUTPATIENT
Start: 2021-09-20 | End: 2021-09-21

## 2021-09-20 NOTE — PLAN OF CARE
Problem: Patient/Family Goals  Goal: Patient/Family Long Term Goal  Description: Patient's Long Term Goal: Pain Control    - See additional Care Plan goals for specific interventions  9/19/2021 2335 by Nicola Bateman RN  Outcome: Progressing  9/19/202

## 2021-09-20 NOTE — ANESTHESIA PREPROCEDURE EVALUATION
PRE-OP EVALUATION    Patient Name: Jamal Sires    Admit Diagnosis: Pregnancy    Pre-op Diagnosis: * No pre-op diagnosis entered *        Anesthesia Procedure: LABOR ANALGESIA    * No surgeons found in log *    Pre-op vitals reviewed.   Temp: 98.5 °F (3 108 (90 Base) MCG/ACT Inhalation Aero Soln, Inhale 2 puffs into the lungs every 4 (four) hours as needed for Wheezing., Disp: 6.7 g, Rfl: 0        Allergies: Pcns [Penicillins]      Anesthesia Evaluation    Patient summary reviewed.     Anesthetic Complicat verified and patient meets guidelines. Post-procedure pain management plan discussed with surgeon and patient.       Plan/risks discussed with: patient and spouse                Present on Admission:  **None**

## 2021-09-20 NOTE — ANESTHESIA PROCEDURE NOTES
Labor Analgesia  Performed by: Luis Perla MD  Authorized by: Luis Perla MD       General Information and Staff    Start Time:  9/19/2021 9:24 PM  End Time:  9/19/2021 9:32 PM  Anesthesiologist:  Kellen Renae MD  Performed by:   Anesthesiologist

## 2021-09-20 NOTE — PROGRESS NOTES
NURSING ADMISSION NOTE      Patient admitted via Wheelchair  Oriented to room 2206  Safety precautions initiated. Bed in low position. Call light in reach.  KISS in place, ID bands checked

## 2021-09-20 NOTE — H&P
[address] and Delivery Prenatal History and Physical Interval Addendum  Please see full Prenatal Record for this pregnancy      SUBJECTIVE:    Interval History:      This is a pregnancy at 39 weeks admitted for srom and early labor this evening

## 2021-09-20 NOTE — PROGRESS NOTES
Pt is a 29year old female admitted to TRG5/TRG5-A. Patient presents with:  R/o Labor: Patient here with c/o contractions since 1710 every 2-3 minutes. She denies any vaginal bleeding, but states she had a gush of fluid around 1600. +FM.       Pt is

## 2021-09-20 NOTE — L&D DELIVERY NOTE
Term SROM/labor    This patient was admitted to the hospital and proceeded to have a vaginal birth. A 7 pound 15 ounce male infant was delivered. Head delivery was controlled. Fetal body delivered without difficulty.   Nares and mouth were bulb suctione

## 2021-09-21 VITALS
HEIGHT: 67.01 IN | RESPIRATION RATE: 18 BRPM | DIASTOLIC BLOOD PRESSURE: 85 MMHG | TEMPERATURE: 98 F | BODY MASS INDEX: 33 KG/M2 | HEART RATE: 70 BPM | OXYGEN SATURATION: 98 % | SYSTOLIC BLOOD PRESSURE: 124 MMHG | WEIGHT: 210.25 LBS

## 2021-09-21 LAB
BASOPHILS # BLD AUTO: 0.05 X10(3) UL (ref 0–0.2)
BASOPHILS NFR BLD AUTO: 0.5 %
EOSINOPHIL # BLD AUTO: 0.08 X10(3) UL (ref 0–0.7)
EOSINOPHIL NFR BLD AUTO: 0.8 %
ERYTHROCYTE [DISTWIDTH] IN BLOOD BY AUTOMATED COUNT: 12.6 %
HCT VFR BLD AUTO: 31.5 %
HGB BLD-MCNC: 10.5 G/DL
IMM GRANULOCYTES # BLD AUTO: 0.06 X10(3) UL (ref 0–1)
IMM GRANULOCYTES NFR BLD: 0.6 %
LYMPHOCYTES # BLD AUTO: 1.39 X10(3) UL (ref 1–4)
LYMPHOCYTES NFR BLD AUTO: 14.2 %
MCH RBC QN AUTO: 32.5 PG (ref 26–34)
MCHC RBC AUTO-ENTMCNC: 33.3 G/DL (ref 31–37)
MCV RBC AUTO: 97.5 FL
MONOCYTES # BLD AUTO: 0.59 X10(3) UL (ref 0.1–1)
MONOCYTES NFR BLD AUTO: 6 %
NEUTROPHILS # BLD AUTO: 7.63 X10 (3) UL (ref 1.5–7.7)
NEUTROPHILS # BLD AUTO: 7.63 X10(3) UL (ref 1.5–7.7)
NEUTROPHILS NFR BLD AUTO: 77.9 %
PLATELET # BLD AUTO: 141 10(3)UL (ref 150–450)
RBC # BLD AUTO: 3.23 X10(6)UL
WBC # BLD AUTO: 9.8 X10(3) UL (ref 4–11)

## 2021-09-21 PROCEDURE — 85025 COMPLETE CBC W/AUTO DIFF WBC: CPT | Performed by: OBSTETRICS & GYNECOLOGY

## 2021-09-21 RX ORDER — FUROSEMIDE 10 MG/ML
20 INJECTION INTRAMUSCULAR; INTRAVENOUS ONCE
Status: COMPLETED | OUTPATIENT
Start: 2021-09-21 | End: 2021-09-21

## 2021-09-21 NOTE — PLAN OF CARE
Problem: POSTPARTUM  Goal: Long Term Goal:Experiences normal postpartum course  Description: INTERVENTIONS:  - Assess and monitor vital signs and lab values. - Assess fundus and lochia. - Provide ice/sitz baths for perineum discomfort.   - Monitor heali Pt presents with c/o weakness and diarrhea since Friday.  Pt reports she stopped taking a medication for her fibromyalgia and feels like she could be withdrawing or having a reaction to new medication.  Pt is aox4 and ambulatory with steady gait at triage.   pain/trauma. - Instruct and provide assistance with proper latch. - Review techniques for milk expression (breast pumping) and storage of breast milk. Provide pumping equipment/supplies, instructions and assistance, as needed.   - Encourage rooming-in and

## 2021-09-21 NOTE — PROGRESS NOTES
Labor Analgesia Follow Up Note    Patient underwent epidural anesthesia for labor analgesia,    Placenta Date/Time: 9/20/2021  4:44 AM    Delivery Date/Time[de-identified] 9/20/2021  4:37 AM    /85 (BP Location: Left arm)   Pulse 70   Temp 98.2 °F (36.8 °C) (Oral

## 2021-09-21 NOTE — PROGRESS NOTES
NURSING DISCHARGE NOTE    Discharged Home via Wheelchair. Accompanied by Spouse and Support staff  Belongings Taken by patient/family. KISS discharged and removed, ID bands checked.  Discharge instructions given

## 2021-09-21 NOTE — PROGRESS NOTES
Postpartum Progress Note    SUBJECTIVE:  Postpartum day #1 s/p     No overnight events. Patient doing well without complaints. Ambulating, tolerating PO, pain well controlled. Had urinary retention overnight and salazar replaced.   She reports having

## 2021-09-23 ENCOUNTER — HOSPITAL ENCOUNTER (EMERGENCY)
Facility: HOSPITAL | Age: 36
Discharge: HOME | End: 2021-09-23
Attending: EMERGENCY MEDICINE
Payer: COMMERCIAL

## 2021-09-23 ENCOUNTER — APPOINTMENT (OUTPATIENT)
Dept: GENERAL RADIOLOGY | Facility: HOSPITAL | Age: 36
End: 2021-09-23
Attending: EMERGENCY MEDICINE
Payer: COMMERCIAL

## 2021-09-23 VITALS
SYSTOLIC BLOOD PRESSURE: 137 MMHG | HEIGHT: 67 IN | DIASTOLIC BLOOD PRESSURE: 77 MMHG | HEART RATE: 54 BPM | RESPIRATION RATE: 14 BRPM | TEMPERATURE: 98 F | BODY MASS INDEX: 31.11 KG/M2 | WEIGHT: 198.19 LBS | OXYGEN SATURATION: 99 %

## 2021-09-23 DIAGNOSIS — R07.89 CHEST PAIN, NON-CARDIAC: Primary | ICD-10-CM

## 2021-09-23 DIAGNOSIS — R00.2 PALPITATIONS: ICD-10-CM

## 2021-09-23 PROCEDURE — 99284 EMERGENCY DEPT VISIT MOD MDM: CPT

## 2021-09-23 PROCEDURE — 81001 URINALYSIS AUTO W/SCOPE: CPT | Performed by: EMERGENCY MEDICINE

## 2021-09-23 PROCEDURE — 80053 COMPREHEN METABOLIC PANEL: CPT | Performed by: EMERGENCY MEDICINE

## 2021-09-23 PROCEDURE — 87086 URINE CULTURE/COLONY COUNT: CPT | Performed by: EMERGENCY MEDICINE

## 2021-09-23 PROCEDURE — 85025 COMPLETE CBC W/AUTO DIFF WBC: CPT | Performed by: EMERGENCY MEDICINE

## 2021-09-23 PROCEDURE — 93010 ELECTROCARDIOGRAM REPORT: CPT

## 2021-09-23 PROCEDURE — 84484 ASSAY OF TROPONIN QUANT: CPT | Performed by: EMERGENCY MEDICINE

## 2021-09-23 PROCEDURE — 36415 COLL VENOUS BLD VENIPUNCTURE: CPT

## 2021-09-23 PROCEDURE — 93005 ELECTROCARDIOGRAM TRACING: CPT

## 2021-09-23 PROCEDURE — 71045 X-RAY EXAM CHEST 1 VIEW: CPT | Performed by: EMERGENCY MEDICINE

## 2021-09-23 NOTE — ED INITIAL ASSESSMENT (HPI)
Pt was retaining fluid during pregnancy. Pt feels that her swelling is worse. Her rings are getting tight again. She has a productive cough at night. At night her heart is racing.

## 2021-09-23 NOTE — ED PROVIDER NOTES
Patient Seen in: [facility] Emergency Department      History   Patient presents with:  Cough/URI    Stated Complaint: 4 days post partum, coughing, heart racing. Subjective:   HPI     43-year-old woman who delivered her second baby 4 days ago. (36.5 °C)   Temp src 09/23/21 1057 Temporal   SpO2 09/23/21 1057 98 %   O2 Device 09/23/21 1057 None (Room air)       Current:/77   Pulse 54   Temp 97.7 °F (36.5 °C) (Temporal)   Resp 14   Ht 170.2 cm (5' 7\")   Wt 89.9 kg   LMP 12/19/2019   SpO2 99% DIFFERENTIAL[[id number]]          Abnormal            Final result                 Please view results for these tests on the individual orders. URINE CULTURE, ROUTINE     EKG    Rate, intervals and axes as noted on EKG Report.   Rate: 62  Rhythm: Sinus Rh

## 2021-09-24 ENCOUNTER — TELEPHONE (OUTPATIENT)
Dept: OBGYN UNIT | Facility: HOSPITAL | Age: 36
End: 2021-09-24

## 2021-10-11 PROBLEM — O09.299 HISTORY OF PRE-ECLAMPSIA IN PRIOR PREGNANCY, CURRENTLY PREGNANT (HCC): Status: RESOLVED | Noted: 2021-04-06 | Resolved: 2021-10-11

## 2021-10-11 PROBLEM — O09.299 HISTORY OF PRE-ECLAMPSIA IN PRIOR PREGNANCY, CURRENTLY PREGNANT: Status: RESOLVED | Noted: 2021-04-06 | Resolved: 2021-10-11

## 2021-10-11 PROBLEM — Z34.90 PREGNANCY: Status: RESOLVED | Noted: 2021-09-19 | Resolved: 2021-10-11

## 2021-10-11 PROBLEM — Z34.90 PREGNANCY (HCC): Status: RESOLVED | Noted: 2021-09-19 | Resolved: 2021-10-11

## 2021-11-08 PROBLEM — O09.529 ANTEPARTUM MULTIGRAVIDA OF ADVANCED MATERNAL AGE (HCC): Status: RESOLVED | Noted: 2021-03-09 | Resolved: 2021-11-08

## 2021-11-08 PROBLEM — Z87.51 HISTORY OF PRETERM DELIVERY: Status: RESOLVED | Noted: 2021-03-14 | Resolved: 2021-11-08

## 2021-11-08 PROBLEM — O09.529 ANTEPARTUM MULTIGRAVIDA OF ADVANCED MATERNAL AGE: Status: RESOLVED | Noted: 2021-03-09 | Resolved: 2021-11-08

## 2022-08-08 ENCOUNTER — OFFICE VISIT (OUTPATIENT)
Dept: INTERNAL MEDICINE CLINIC | Facility: CLINIC | Age: 37
End: 2022-08-08
Payer: COMMERCIAL

## 2022-08-08 VITALS
BODY MASS INDEX: 27.6 KG/M2 | HEIGHT: 66.93 IN | SYSTOLIC BLOOD PRESSURE: 112 MMHG | RESPIRATION RATE: 16 BRPM | WEIGHT: 175.81 LBS | HEART RATE: 92 BPM | DIASTOLIC BLOOD PRESSURE: 74 MMHG | OXYGEN SATURATION: 98 % | TEMPERATURE: 97 F

## 2022-08-08 DIAGNOSIS — M54.9 UPPER BACK PAIN ON RIGHT SIDE: ICD-10-CM

## 2022-08-08 DIAGNOSIS — M54.50 ACUTE MIDLINE LOW BACK PAIN WITHOUT SCIATICA: Primary | ICD-10-CM

## 2022-08-08 RX ORDER — DEXTROAMPHETAMINE SACCHARATE, AMPHETAMINE ASPARTATE, DEXTROAMPHETAMINE SULFATE AND AMPHETAMINE SULFATE 1.25; 1.25; 1.25; 1.25 MG/1; MG/1; MG/1; MG/1
5 TABLET ORAL DAILY
COMMUNITY
Start: 2022-07-21

## 2022-08-08 RX ORDER — CYCLOBENZAPRINE HCL 10 MG
10 TABLET ORAL NIGHTLY PRN
Qty: 30 TABLET | Refills: 0 | Status: SHIPPED | OUTPATIENT
Start: 2022-08-08 | End: 2022-08-28

## 2022-08-08 RX ORDER — DEXTROAMPHETAMINE/AMPHETAMINE 15 MG
15 CAPSULE, EXT RELEASE 24 HR ORAL DAILY
COMMUNITY
Start: 2022-07-21

## 2022-08-08 RX ORDER — NAPROXEN 500 MG/1
500 TABLET ORAL 2 TIMES DAILY PRN
Qty: 30 TABLET | Refills: 1 | Status: SHIPPED | OUTPATIENT
Start: 2022-08-08

## 2022-08-25 ENCOUNTER — TELEPHONE (OUTPATIENT)
Dept: INTERNAL MEDICINE CLINIC | Facility: CLINIC | Age: 37
End: 2022-08-25

## 2022-08-25 DIAGNOSIS — Z00.00 ROUTINE GENERAL MEDICAL EXAMINATION AT A HEALTH CARE FACILITY: Primary | ICD-10-CM

## 2022-08-25 DIAGNOSIS — Z13.220 SCREENING FOR LIPID DISORDERS: ICD-10-CM

## 2022-08-25 DIAGNOSIS — Z13.0 SCREENING FOR DISORDER OF BLOOD AND BLOOD-FORMING ORGANS: ICD-10-CM

## 2022-08-25 DIAGNOSIS — Z13.228 SCREENING FOR METABOLIC DISORDER: ICD-10-CM

## 2022-08-25 DIAGNOSIS — Z13.29 SCREENING FOR THYROID DISORDER: ICD-10-CM

## 2022-08-25 NOTE — TELEPHONE ENCOUNTER
Future Appointments   Date Time Provider Winter Luz   9/6/2022 11:20 AM Maria Fernanda Segundo MD [facility]     Orders to   [facility]    [facility] must fast no call back required

## 2022-08-29 LAB
ABSOLUTE BASOPHILS: 50 CELLS/UL (ref 0–200)
ABSOLUTE EOSINOPHILS: 80 CELLS/UL (ref 15–500)
ABSOLUTE LYMPHOCYTES: 1345 CELLS/UL (ref 850–3900)
ABSOLUTE MONOCYTES: 440 CELLS/UL (ref 200–950)
ABSOLUTE NEUTROPHILS: 3085 CELLS/UL (ref 1500–7800)
ALBUMIN/GLOBULIN RATIO: 1.9 (CALC) (ref 1–2.5)
ALBUMIN: 4.6 G/DL (ref 3.6–5.1)
ALKALINE PHOSPHATASE: 51 U/L (ref 31–125)
ALT: 10 U/L (ref 6–29)
AST: 15 U/L (ref 10–30)
BASOPHILS: 1 %
BILIRUBIN, TOTAL: 0.5 MG/DL (ref 0.2–1.2)
BUN: 17 MG/DL (ref 7–25)
CALCIUM: 9.3 MG/DL (ref 8.6–10.2)
CARBON DIOXIDE: 27 MMOL/L (ref 20–32)
CHLORIDE: 102 MMOL/L (ref 98–110)
CHOL/HDLC RATIO: 2.1 (CALC)
CHOLESTEROL, TOTAL: 174 MG/DL
CREATININE: 0.91 MG/DL (ref 0.5–0.97)
EGFR: 84 ML/MIN/1.73M2
EOSINOPHILS: 1.6 %
GLOBULIN: 2.4 G/DL (CALC) (ref 1.9–3.7)
GLUCOSE: 83 MG/DL (ref 65–99)
HDL CHOLESTEROL: 84 MG/DL
HEMATOCRIT: 39.1 % (ref 35–45)
HEMOGLOBIN: 13.1 G/DL (ref 11.7–15.5)
LDL-CHOLESTEROL: 77 MG/DL (CALC)
LYMPHOCYTES: 26.9 %
MCH: 31.2 PG (ref 27–33)
MCHC: 33.5 G/DL (ref 32–36)
MCV: 93.1 FL (ref 80–100)
MONOCYTES: 8.8 %
MPV: 8.8 FL (ref 7.5–12.5)
NEUTROPHILS: 61.7 %
NON-HDL CHOLESTEROL: 90 MG/DL (CALC)
PLATELET COUNT: 262 THOUSAND/UL (ref 140–400)
POTASSIUM: 4.1 MMOL/L (ref 3.5–5.3)
PROTEIN, TOTAL: 7 G/DL (ref 6.1–8.1)
RDW: 11.7 % (ref 11–15)
RED BLOOD CELL COUNT: 4.2 MILLION/UL (ref 3.8–5.1)
RHEUMATOID FACTOR: <14 IU/ML
SED RATE BY MODIFIED$WESTERGREN: 2 MM/H
SODIUM: 137 MMOL/L (ref 135–146)
TRIGLYCERIDES: 45 MG/DL
TSH W/REFLEX TO FT4: 2.18 MIU/L
WHITE BLOOD CELL COUNT: 5 THOUSAND/UL (ref 3.8–10.8)

## 2022-11-01 ENCOUNTER — OFFICE VISIT (OUTPATIENT)
Dept: INTERNAL MEDICINE CLINIC | Facility: CLINIC | Age: 37
End: 2022-11-01
Payer: COMMERCIAL

## 2022-11-01 VITALS
HEART RATE: 70 BPM | RESPIRATION RATE: 12 BRPM | OXYGEN SATURATION: 99 % | WEIGHT: 170.19 LBS | SYSTOLIC BLOOD PRESSURE: 118 MMHG | BODY MASS INDEX: 26.71 KG/M2 | HEIGHT: 67 IN | DIASTOLIC BLOOD PRESSURE: 72 MMHG

## 2022-11-01 DIAGNOSIS — Z00.00 ROUTINE GENERAL MEDICAL EXAMINATION AT A HEALTH CARE FACILITY: Primary | ICD-10-CM

## 2022-11-01 DIAGNOSIS — Z23 NEED FOR INFLUENZA VACCINATION: ICD-10-CM

## 2022-11-01 PROCEDURE — 3074F SYST BP LT 130 MM HG: CPT | Performed by: INTERNAL MEDICINE

## 2022-11-01 PROCEDURE — 90471 IMMUNIZATION ADMIN: CPT | Performed by: INTERNAL MEDICINE

## 2022-11-01 PROCEDURE — 90686 IIV4 VACC NO PRSV 0.5 ML IM: CPT | Performed by: INTERNAL MEDICINE

## 2022-11-01 PROCEDURE — 3078F DIAST BP <80 MM HG: CPT | Performed by: INTERNAL MEDICINE

## 2022-11-01 PROCEDURE — 3008F BODY MASS INDEX DOCD: CPT | Performed by: INTERNAL MEDICINE

## 2022-11-01 PROCEDURE — 99395 PREV VISIT EST AGE 18-39: CPT | Performed by: INTERNAL MEDICINE

## 2022-11-21 ENCOUNTER — OFFICE VISIT (OUTPATIENT)
Dept: INTERNAL MEDICINE CLINIC | Facility: CLINIC | Age: 37
End: 2022-11-21
Payer: COMMERCIAL

## 2022-11-21 ENCOUNTER — HOSPITAL ENCOUNTER (OUTPATIENT)
Dept: GENERAL RADIOLOGY | Age: 37
Discharge: HOME | End: 2022-11-21
Attending: PHYSICIAN ASSISTANT
Payer: COMMERCIAL

## 2022-11-21 ENCOUNTER — TELEPHONE (OUTPATIENT)
Dept: INTERNAL MEDICINE CLINIC | Facility: CLINIC | Age: 37
End: 2022-11-21

## 2022-11-21 VITALS
BODY MASS INDEX: 25.94 KG/M2 | DIASTOLIC BLOOD PRESSURE: 74 MMHG | WEIGHT: 169.19 LBS | SYSTOLIC BLOOD PRESSURE: 112 MMHG | RESPIRATION RATE: 16 BRPM | HEIGHT: 67.72 IN | HEART RATE: 76 BPM | TEMPERATURE: 97 F | OXYGEN SATURATION: 99 %

## 2022-11-21 DIAGNOSIS — J22 LRTI (LOWER RESPIRATORY TRACT INFECTION): ICD-10-CM

## 2022-11-21 DIAGNOSIS — J22 LRTI (LOWER RESPIRATORY TRACT INFECTION): Primary | ICD-10-CM

## 2022-11-21 PROCEDURE — 3078F DIAST BP <80 MM HG: CPT | Performed by: PHYSICIAN ASSISTANT

## 2022-11-21 PROCEDURE — 87637 SARSCOV2&INF A&B&RSV AMP PRB: CPT | Performed by: PHYSICIAN ASSISTANT

## 2022-11-21 PROCEDURE — 3074F SYST BP LT 130 MM HG: CPT | Performed by: PHYSICIAN ASSISTANT

## 2022-11-21 PROCEDURE — 71046 X-RAY EXAM CHEST 2 VIEWS: CPT | Performed by: PHYSICIAN ASSISTANT

## 2022-11-21 PROCEDURE — 3008F BODY MASS INDEX DOCD: CPT | Performed by: PHYSICIAN ASSISTANT

## 2022-11-21 PROCEDURE — 99213 OFFICE O/P EST LOW 20 MIN: CPT | Performed by: PHYSICIAN ASSISTANT

## 2022-11-21 RX ORDER — CODEINE PHOSPHATE AND GUAIFENESIN 10; 100 MG/5ML; MG/5ML
5 SOLUTION ORAL NIGHTLY PRN
Qty: 180 ML | Refills: 0 | Status: SHIPPED | OUTPATIENT
Start: 2022-11-21 | End: 2022-11-21

## 2022-11-21 RX ORDER — ALBUTEROL SULFATE 90 UG/1
2 AEROSOL, METERED RESPIRATORY (INHALATION) EVERY 4 HOURS PRN
Qty: 1 EACH | Refills: 1 | Status: SHIPPED | OUTPATIENT
Start: 2022-11-21 | End: 2023-02-24 | Stop reason: ALTCHOICE

## 2022-11-21 RX ORDER — CODEINE PHOSPHATE AND GUAIFENESIN 10; 100 MG/5ML; MG/5ML
5 SOLUTION ORAL NIGHTLY PRN
Qty: 180 ML | Refills: 0 | Status: SHIPPED | OUTPATIENT
Start: 2022-11-21 | End: 2023-02-24 | Stop reason: ALTCHOICE

## 2022-11-21 RX ORDER — ALBUTEROL SULFATE 90 UG/1
2 AEROSOL, METERED RESPIRATORY (INHALATION) EVERY 4 HOURS PRN
Qty: 1 EACH | Refills: 1 | Status: SHIPPED | OUTPATIENT
Start: 2022-11-21 | End: 2022-11-21

## 2022-11-21 NOTE — TELEPHONE ENCOUNTER
Pt saw CS today [facility] do not have any guaiFENesin-codeine (CHERATUSSIN AC) 100-10 MG/5ML Oral Solution and [facility] does any other [facility]    She is requesting that we send both rx sent in today to [facility] to send now to [facility]    albuterol [facility]) 108 (90 Base) MCG/ACT Inhalation Aero Soln

## 2022-11-21 NOTE — TELEPHONE ENCOUNTER
Patient notified. She was under the impression from previous conversation that the nebulizer was mentioned may be more helpful with her symptoms.  She is aware of meds sent just wanted to confirm no to the neb

## 2022-11-21 NOTE — TELEPHONE ENCOUNTER
Albuterol inhaler was already sent to the pharmacy. She does not have a nebulizer at home and insurance is unlikely to cover this for her without a h/o asthma. Continue with inhaler for now.

## 2022-11-21 NOTE — TELEPHONE ENCOUNTER
Pt states during OV today, nebulizer was discussed. She is asking if you can sent that in along with the medication to [address] [place] [address],[facility].

## 2022-11-21 NOTE — TELEPHONE ENCOUNTER
Patient notified to contact us not improving with inhaler to discuss neb. Patient denies questions. Encounter closed.

## 2022-11-22 LAB
FLUAV + FLUBV RNA SPEC NAA+PROBE: NOT DETECTED
FLUAV + FLUBV RNA SPEC NAA+PROBE: NOT DETECTED
RSV RNA SPEC NAA+PROBE: DETECTED
SARS-COV-2 RNA RESP QL NAA+PROBE: NOT DETECTED

## 2022-11-22 RX ORDER — ALBUTEROL SULFATE 2.5 MG/3ML
2.5 SOLUTION RESPIRATORY (INHALATION) EVERY 4 HOURS PRN
Qty: 150 ML | Refills: 1 | Status: SHIPPED | OUTPATIENT
Start: 2022-11-22

## 2022-11-22 RX ORDER — PREDNISONE 20 MG/1
40 TABLET ORAL DAILY
Qty: 10 TABLET | Refills: 0 | Status: SHIPPED | OUTPATIENT
Start: 2022-11-22 | End: 2022-11-27

## 2022-11-27 ENCOUNTER — HOSPITAL ENCOUNTER (OUTPATIENT)
Age: 37
Discharge: HOME | End: 2022-11-27
Payer: COMMERCIAL

## 2022-11-27 ENCOUNTER — TELEMEDICINE (OUTPATIENT)
Dept: TELEHEALTH | Age: 37
End: 2022-11-27

## 2022-11-27 ENCOUNTER — APPOINTMENT (OUTPATIENT)
Dept: GENERAL RADIOLOGY | Age: 37
End: 2022-11-27
Attending: NURSE PRACTITIONER
Payer: COMMERCIAL

## 2022-11-27 VITALS
SYSTOLIC BLOOD PRESSURE: 122 MMHG | BODY MASS INDEX: 26.53 KG/M2 | HEART RATE: 92 BPM | OXYGEN SATURATION: 97 % | TEMPERATURE: 99 F | RESPIRATION RATE: 24 BRPM | DIASTOLIC BLOOD PRESSURE: 72 MMHG | WEIGHT: 169 LBS | HEIGHT: 67 IN

## 2022-11-27 DIAGNOSIS — Z02.9 ADMINISTRATIVE ENCOUNTER: Primary | ICD-10-CM

## 2022-11-27 DIAGNOSIS — R05.9 COUGH: ICD-10-CM

## 2022-11-27 DIAGNOSIS — R05.9 COUGH, UNSPECIFIED TYPE: Primary | ICD-10-CM

## 2022-11-27 DIAGNOSIS — J21.0 ACUTE BRONCHIOLITIS DUE TO RESPIRATORY SYNCYTIAL VIRUS (RSV): ICD-10-CM

## 2022-11-27 LAB
POCT INFLUENZA A: NEGATIVE
POCT INFLUENZA B: NEGATIVE
SARS-COV-2 RNA RESP QL NAA+PROBE: NOT DETECTED

## 2022-11-27 PROCEDURE — 71046 X-RAY EXAM CHEST 2 VIEWS: CPT | Performed by: NURSE PRACTITIONER

## 2022-11-27 PROCEDURE — U0002 COVID-19 LAB TEST NON-CDC: HCPCS | Performed by: NURSE PRACTITIONER

## 2022-11-27 PROCEDURE — 99213 OFFICE O/P EST LOW 20 MIN: CPT | Performed by: NURSE PRACTITIONER

## 2022-11-27 PROCEDURE — 87502 INFLUENZA DNA AMP PROBE: CPT | Performed by: NURSE PRACTITIONER

## 2022-11-27 NOTE — PROGRESS NOTES
Pt submitted evisit for cough. Has RSV and having nighttime spells where cough leads to vomiting and struggling to breathe. Last night the coughing spell lasted for an hour. Attempted to connect to video visit after reviewing chart but pt cancelled visit and is now doing evisit. Contacted pt by phone. Was seen 11/21/22; tested positive for RSV; had CXR and treated with albuterol MDI and neg, prednisone, and cheratussin. Reports she took last dose of prednisone today. Using albuterol neb BID - causes heart to race. Reports cough occas prod. Has been sick for a week and still not improving. Advised pt of limitations of telehealth; pt needs lung assessment to help guide treatment. Referred to [facility] or [facility]. Pt prefers [facility]. No visit charge.

## 2022-11-27 NOTE — ED INITIAL ASSESSMENT (HPI)
Patient reports cough x 8 days, diagnosed with RSV earlier in the week. Denies any fever. Reports severe dry cough, worse at night and when laying down. Using neb treatments at home as well as cough syrup, nyquil, and prednisone without relief.

## 2022-12-16 ENCOUNTER — APPOINTMENT (OUTPATIENT)
Dept: CT IMAGING | Age: 37
End: 2022-12-16
Attending: NURSE PRACTITIONER
Payer: COMMERCIAL

## 2022-12-16 ENCOUNTER — HOSPITAL ENCOUNTER (OUTPATIENT)
Age: 37
Discharge: HOME | End: 2022-12-16
Payer: COMMERCIAL

## 2022-12-16 ENCOUNTER — APPOINTMENT (OUTPATIENT)
Dept: ULTRASOUND IMAGING | Age: 37
End: 2022-12-16
Attending: NURSE PRACTITIONER
Payer: COMMERCIAL

## 2022-12-16 VITALS
DIASTOLIC BLOOD PRESSURE: 60 MMHG | SYSTOLIC BLOOD PRESSURE: 110 MMHG | HEART RATE: 67 BPM | OXYGEN SATURATION: 99 % | RESPIRATION RATE: 18 BRPM | TEMPERATURE: 99 F

## 2022-12-16 DIAGNOSIS — R10.9 ABDOMINAL PAIN OF UNKNOWN ETIOLOGY: Primary | ICD-10-CM

## 2022-12-16 LAB
#MXD IC: 0.7 X10ˆ3/UL (ref 0.1–1)
B-HCG UR QL: NEGATIVE
BUN BLD-MCNC: 10 MG/DL (ref 7–18)
CHLORIDE BLD-SCNC: 102 MMOL/L (ref 98–112)
CO2 BLD-SCNC: 28 MMOL/L (ref 21–32)
CREAT BLD-MCNC: 0.8 MG/DL
GFR SERPLBLD BASED ON 1.73 SQ M-ARVRAT: 98 ML/MIN/1.73M2 (ref 60–?)
GLUCOSE BLD-MCNC: 89 MG/DL (ref 70–99)
HCT VFR BLD AUTO: 38.2 %
HCT VFR BLD CALC: 37 %
HGB BLD-MCNC: 12.4 G/DL
ISTAT IONIZED CALCIUM FOR CHEM 8: 1.24 MMOL/L (ref 1.12–1.32)
LYMPHOCYTES # BLD AUTO: 1.4 X10ˆ3/UL (ref 1–4)
LYMPHOCYTES NFR BLD AUTO: 17 %
MCH RBC QN AUTO: 29.8 PG (ref 26–34)
MCHC RBC AUTO-ENTMCNC: 32.5 G/DL (ref 31–37)
MCV RBC AUTO: 91.8 FL (ref 80–100)
MIXED CELL %: 8.1 %
NEUTROPHILS # BLD AUTO: 6.2 X10ˆ3/UL (ref 1.5–7.7)
NEUTROPHILS NFR BLD AUTO: 74.9 %
PLATELET # BLD AUTO: 313 X10ˆ3/UL (ref 150–450)
POCT BILIRUBIN URINE: NEGATIVE
POCT BLOOD URINE: NEGATIVE
POCT GLUCOSE URINE: NEGATIVE MG/DL
POCT KETONE URINE: NEGATIVE MG/DL
POCT LEUKOCYTE ESTERASE URINE: NEGATIVE
POCT NITRITE URINE: NEGATIVE
POCT PH URINE: 7 (ref 5–8)
POCT PROTEIN URINE: NEGATIVE MG/DL
POCT SPECIFIC GRAVITY URINE: 1.02
POCT URINE CLARITY: CLEAR
POCT URINE COLOR: YELLOW
POCT UROBILINOGEN URINE: 0.2 MG/DL
POTASSIUM BLD-SCNC: 4 MMOL/L (ref 3.6–5.1)
RBC # BLD AUTO: 4.16 X10ˆ6/UL
SODIUM BLD-SCNC: 139 MMOL/L (ref 136–145)
WBC # BLD AUTO: 8.3 X10ˆ3/UL (ref 4–11)

## 2022-12-16 PROCEDURE — 96361 HYDRATE IV INFUSION ADD-ON: CPT

## 2022-12-16 PROCEDURE — 81002 URINALYSIS NONAUTO W/O SCOPE: CPT | Performed by: NURSE PRACTITIONER

## 2022-12-16 PROCEDURE — 99215 OFFICE O/P EST HI 40 MIN: CPT

## 2022-12-16 PROCEDURE — 74177 CT ABD & PELVIS W/CONTRAST: CPT | Performed by: NURSE PRACTITIONER

## 2022-12-16 PROCEDURE — 80047 BASIC METABLC PNL IONIZED CA: CPT

## 2022-12-16 PROCEDURE — 96374 THER/PROPH/DIAG INJ IV PUSH: CPT

## 2022-12-16 PROCEDURE — 76856 US EXAM PELVIC COMPLETE: CPT | Performed by: NURSE PRACTITIONER

## 2022-12-16 PROCEDURE — 99214 OFFICE O/P EST MOD 30 MIN: CPT

## 2022-12-16 PROCEDURE — 81025 URINE PREGNANCY TEST: CPT

## 2022-12-16 PROCEDURE — 85025 COMPLETE CBC W/AUTO DIFF WBC: CPT | Performed by: NURSE PRACTITIONER

## 2022-12-16 PROCEDURE — 93975 VASCULAR STUDY: CPT | Performed by: NURSE PRACTITIONER

## 2022-12-16 PROCEDURE — 87086 URINE CULTURE/COLONY COUNT: CPT | Performed by: NURSE PRACTITIONER

## 2022-12-16 PROCEDURE — 76830 TRANSVAGINAL US NON-OB: CPT | Performed by: NURSE PRACTITIONER

## 2022-12-16 RX ORDER — HYDROCODONE BITARTRATE AND ACETAMINOPHEN 5; 325 MG/1; MG/1
1 TABLET ORAL ONCE
Status: COMPLETED | OUTPATIENT
Start: 2022-12-16 | End: 2022-12-16

## 2022-12-16 RX ORDER — KETOROLAC TROMETHAMINE 30 MG/ML
30 INJECTION, SOLUTION INTRAMUSCULAR; INTRAVENOUS ONCE
Status: COMPLETED | OUTPATIENT
Start: 2022-12-16 | End: 2022-12-16

## 2022-12-16 RX ORDER — SODIUM CHLORIDE 9 MG/ML
1000 INJECTION, SOLUTION INTRAVENOUS ONCE
Status: COMPLETED | OUTPATIENT
Start: 2022-12-16 | End: 2022-12-16

## 2022-12-16 RX ORDER — TRAMADOL HYDROCHLORIDE 50 MG/1
TABLET ORAL EVERY 6 HOURS PRN
Qty: 10 TABLET | Refills: 0 | Status: SHIPPED | OUTPATIENT
Start: 2022-12-16 | End: 2022-12-21

## 2022-12-16 RX ORDER — IOHEXOL 350 MG/ML
85 INJECTION, SOLUTION INTRAVENOUS
Status: COMPLETED | OUTPATIENT
Start: 2022-12-16 | End: 2022-12-16

## 2022-12-16 NOTE — ED INITIAL ASSESSMENT (HPI)
C/O left lower abdominal pain started this morning extreme pain for about an hour, at the moment dull/achy. Denies nausea/vomiting/diarrhea. Last bowel movement this morning. States \"history of cyst in the ovary that burst 18 years ago\".

## 2023-02-24 ENCOUNTER — OFFICE VISIT (OUTPATIENT)
Dept: INTERNAL MEDICINE CLINIC | Facility: CLINIC | Age: 38
End: 2023-02-24
Payer: COMMERCIAL

## 2023-02-24 VITALS
WEIGHT: 171.19 LBS | BODY MASS INDEX: 26.56 KG/M2 | OXYGEN SATURATION: 98 % | HEIGHT: 67.32 IN | HEART RATE: 88 BPM | SYSTOLIC BLOOD PRESSURE: 108 MMHG | DIASTOLIC BLOOD PRESSURE: 62 MMHG | TEMPERATURE: 97 F | RESPIRATION RATE: 16 BRPM

## 2023-02-24 DIAGNOSIS — G43.109 MIGRAINE WITH AURA AND WITHOUT STATUS MIGRAINOSUS, NOT INTRACTABLE: ICD-10-CM

## 2023-02-24 DIAGNOSIS — J01.00 ACUTE MAXILLARY SINUSITIS, RECURRENCE NOT SPECIFIED: Primary | ICD-10-CM

## 2023-02-24 PROCEDURE — 3078F DIAST BP <80 MM HG: CPT | Performed by: INTERNAL MEDICINE

## 2023-02-24 PROCEDURE — 3008F BODY MASS INDEX DOCD: CPT | Performed by: INTERNAL MEDICINE

## 2023-02-24 PROCEDURE — 3074F SYST BP LT 130 MM HG: CPT | Performed by: INTERNAL MEDICINE

## 2023-02-24 PROCEDURE — 99213 OFFICE O/P EST LOW 20 MIN: CPT | Performed by: INTERNAL MEDICINE

## 2023-02-24 RX ORDER — FLUTICASONE PROPIONATE 50 MCG
2 SPRAY, SUSPENSION (ML) NASAL DAILY
Qty: 1 EACH | Refills: 1 | Status: SHIPPED | OUTPATIENT
Start: 2023-02-24 | End: 2024-02-19

## 2023-02-24 RX ORDER — LEVOFLOXACIN 500 MG/1
500 TABLET, FILM COATED ORAL DAILY
Qty: 7 TABLET | Refills: 0 | Status: SHIPPED | OUTPATIENT
Start: 2023-02-24 | End: 2023-03-03

## 2023-02-24 RX ORDER — ONDANSETRON 4 MG/1
4 TABLET, FILM COATED ORAL EVERY 8 HOURS PRN
Qty: 30 TABLET | Refills: 0 | Status: SHIPPED | OUTPATIENT
Start: 2023-02-24

## 2023-03-21 ENCOUNTER — TELEPHONE (OUTPATIENT)
Dept: INTERNAL MEDICINE CLINIC | Facility: CLINIC | Age: 38
End: 2023-03-21

## 2023-03-21 NOTE — TELEPHONE ENCOUNTER
LOV 2/24/23 with TB.    TB, do you want to fill pt's medication for her or continue having Psych order?

## 2023-03-21 NOTE — TELEPHONE ENCOUNTER
Pt called asking if TB would be able to fill her rx for amphetamine-dextroamphetamine 5 MG Oral Tab from now on instead of her psychiatrist? She would like to streamline all her meds and just get from one provider.

## 2023-06-28 NOTE — PLAN OF CARE
Free from fall injury Progressing Consent (Marginal Mandibular)/Introductory Paragraph: The rationale for Mohs was explained to the patient and consent was obtained. The risks, benefits and alternatives to therapy were discussed in detail. Specifically, the risks of damage to the marginal mandibular branch of the facial nerve, infection, scarring, bleeding, prolonged wound healing, incomplete removal, allergy to anesthesia, and recurrence were addressed. Prior to the procedure, the treatment site was clearly identified and confirmed by the patient. All components of Universal Protocol/PAUSE Rule completed.

## 2023-06-29 DIAGNOSIS — G43.109 MIGRAINE WITH AURA AND WITHOUT STATUS MIGRAINOSUS, NOT INTRACTABLE: ICD-10-CM

## 2023-06-29 RX ORDER — ONDANSETRON 4 MG/1
4 TABLET, FILM COATED ORAL EVERY 8 HOURS PRN
Qty: 30 TABLET | Refills: 0 | Status: SHIPPED | OUTPATIENT
Start: 2023-06-29

## 2023-09-14 ENCOUNTER — TELEPHONE (OUTPATIENT)
Dept: INTERNAL MEDICINE CLINIC | Facility: CLINIC | Age: 38
End: 2023-09-14

## 2023-09-14 NOTE — TELEPHONE ENCOUNTER
Called and spoke w/ pt. Pt stated she started feeling sick yesterday. C/o muscle aches, headache, sinus congestion. Denies fever. Pt stated she tested for Covid this morning and it was negative. Testing again now and waiting for results. Pt stated she feels more out of breath, but very minor. Pt stated she feels like her neck is swollen, she was able to tell when she was putting on her skin care this morning. Pt stated she has been taking Tylenol and doesn't feel like it's working. Advised can try Ibuprofen for muscle aches, nasal spray or Sudafed for congestion, rest, stay hydrated and monitor temp. Notified can update us tomorrow on how she is feeling and if she's positive can schedule virtual visit if she would like, otherwise can monitor symptoms at home and isolate. Pt verbalizes understanding and agreeable to plan.      CAROLE HARVEY

## 2023-09-14 NOTE — TELEPHONE ENCOUNTER
Patient states she started feeling sick yesterday;  Chills, muscle fatigue,congestion,headache,no fever.

## 2023-09-19 ENCOUNTER — TELEPHONE (OUTPATIENT)
Dept: INTERNAL MEDICINE CLINIC | Facility: CLINIC | Age: 38
End: 2023-09-19

## 2023-09-19 ENCOUNTER — HOSPITAL ENCOUNTER (OUTPATIENT)
Age: 38
Discharge: HOME | End: 2023-09-19
Attending: EMERGENCY MEDICINE
Payer: COMMERCIAL

## 2023-09-19 VITALS
RESPIRATION RATE: 16 BRPM | HEIGHT: 67 IN | OXYGEN SATURATION: 98 % | WEIGHT: 170 LBS | HEART RATE: 96 BPM | TEMPERATURE: 98 F | DIASTOLIC BLOOD PRESSURE: 77 MMHG | BODY MASS INDEX: 26.68 KG/M2 | SYSTOLIC BLOOD PRESSURE: 113 MMHG

## 2023-09-19 DIAGNOSIS — J06.9 VIRAL URI WITH COUGH: Primary | ICD-10-CM

## 2023-09-19 LAB — SARS-COV-2 RNA RESP QL NAA+PROBE: NOT DETECTED

## 2023-09-19 PROCEDURE — 99212 OFFICE O/P EST SF 10 MIN: CPT

## 2023-09-19 PROCEDURE — 99213 OFFICE O/P EST LOW 20 MIN: CPT

## 2023-09-19 RX ORDER — PSEUDOEPHEDRINE HCL 30 MG
60 TABLET ORAL EVERY 6 HOURS PRN
Qty: 36 TABLET | Refills: 0 | Status: SHIPPED | OUTPATIENT
Start: 2023-09-19 | End: 2023-10-19

## 2023-09-19 NOTE — TELEPHONE ENCOUNTER
Hunter Zambrano, pt's mother calling - not on HIPAA. Hunter Zambrano stated   Pt has a deep and heavy cough and lot's of congestion. Pt is able to breath through her nose but has body aches and the phlegm from nose is going down her throat. Pt stated she feels like something is heavy on her lungs. 2 negative covid tests. Mom stated pt does not have SOB. Pt looking for an appt as soon as possible.

## 2023-09-19 NOTE — TELEPHONE ENCOUNTER
Patient states since Thurs she has had s/s, heavy deep productive cough, body aches getting better, raspy voice, twinges of pain in her ears, heaviness in her lungs. Pt denies fever. Covid testing x2 negative. Pt notified to go to the [facility] for evaluation and possible imaging. Pt agreeable to plan and verbalizes understanding.  FYI to TB.  LOV 2/24/23 with TB.

## 2023-09-28 LAB
AMB EXT BILIRUBIN, TOTAL: 0.6 MG/DL
AMB EXT CALCIUM: 9.1
AMB EXT CHOL/HDL RATIO: 2.4
AMB EXT CHOLESTEROL, TOTAL: 181 MG/DL
AMB EXT CMP ALT: 12 U/L
AMB EXT CMP AST: 14 U/L
AMB EXT CREATININE: 0.82 MG/DL
AMB EXT GLUCOSE: 87 MG/DL
AMB EXT HDL CHOLESTEROL: 76 MG/DL
AMB EXT HGBA1C: 5 %
AMB EXT LDL CHOLESTEROL, DIRECT: 92 MG/DL
AMB EXT TOTAL PROTEIN: 7
AMB EXT TRIGLYCERIDES: 52 MG/DL
AMB EXT TSH: 2.54 MIU/ML

## 2023-10-24 ENCOUNTER — LAB ENCOUNTER (OUTPATIENT)
Dept: LAB | Age: 38
End: 2023-10-24
Attending: PHYSICIAN ASSISTANT

## 2023-10-24 ENCOUNTER — OFFICE VISIT (OUTPATIENT)
Dept: INTERNAL MEDICINE CLINIC | Facility: CLINIC | Age: 38
End: 2023-10-24

## 2023-10-24 VITALS
RESPIRATION RATE: 18 BRPM | WEIGHT: 168.81 LBS | SYSTOLIC BLOOD PRESSURE: 112 MMHG | TEMPERATURE: 97 F | HEART RATE: 97 BPM | BODY MASS INDEX: 26.49 KG/M2 | HEIGHT: 67 IN | DIASTOLIC BLOOD PRESSURE: 64 MMHG | OXYGEN SATURATION: 99 %

## 2023-10-24 DIAGNOSIS — Z82.61 FAMILY HISTORY OF RHEUMATOID ARTHRITIS: ICD-10-CM

## 2023-10-24 DIAGNOSIS — H01.9 EYELID INFLAMMATION: ICD-10-CM

## 2023-10-24 DIAGNOSIS — I49.9 IRREGULAR HEARTBEAT: Primary | ICD-10-CM

## 2023-10-24 LAB
CRP SERPL-MCNC: <0.29 MG/DL (ref ?–0.3)
ERYTHROCYTE [SEDIMENTATION RATE] IN BLOOD: 12 MM/HR
RHEUMATOID FACT SERPL-ACNC: <10 IU/ML (ref ?–15)

## 2023-10-24 PROCEDURE — 3078F DIAST BP <80 MM HG: CPT | Performed by: PHYSICIAN ASSISTANT

## 2023-10-24 PROCEDURE — 36415 COLL VENOUS BLD VENIPUNCTURE: CPT | Performed by: PHYSICIAN ASSISTANT

## 2023-10-24 PROCEDURE — 86225 DNA ANTIBODY NATIVE: CPT

## 2023-10-24 PROCEDURE — 93000 ELECTROCARDIOGRAM COMPLETE: CPT | Performed by: PHYSICIAN ASSISTANT

## 2023-10-24 PROCEDURE — 86200 CCP ANTIBODY: CPT | Performed by: PHYSICIAN ASSISTANT

## 2023-10-24 PROCEDURE — 86038 ANTINUCLEAR ANTIBODIES: CPT

## 2023-10-24 PROCEDURE — 86140 C-REACTIVE PROTEIN: CPT | Performed by: PHYSICIAN ASSISTANT

## 2023-10-24 PROCEDURE — 3008F BODY MASS INDEX DOCD: CPT | Performed by: PHYSICIAN ASSISTANT

## 2023-10-24 PROCEDURE — 86431 RHEUMATOID FACTOR QUANT: CPT | Performed by: PHYSICIAN ASSISTANT

## 2023-10-24 PROCEDURE — 85652 RBC SED RATE AUTOMATED: CPT | Performed by: PHYSICIAN ASSISTANT

## 2023-10-24 PROCEDURE — 99214 OFFICE O/P EST MOD 30 MIN: CPT | Performed by: PHYSICIAN ASSISTANT

## 2023-10-24 PROCEDURE — 3074F SYST BP LT 130 MM HG: CPT | Performed by: PHYSICIAN ASSISTANT

## 2023-10-24 RX ORDER — DEXTROAMPHETAMINE SACCHARATE, AMPHETAMINE ASPARTATE, DEXTROAMPHETAMINE SULFATE AND AMPHETAMINE SULFATE 5; 5; 5; 5 MG/1; MG/1; MG/1; MG/1
20 TABLET ORAL DAILY
COMMUNITY

## 2023-10-25 LAB
ATRIAL RATE: 82 BPM
CCP IGG SERPL-ACNC: 1.1 U/ML (ref 0–6.9)
DSDNA IGG SERPL IA-ACNC: 1 IU/ML
ENA AB SER QL IA: 0.2 UG/L
ENA AB SER QL IA: NEGATIVE
P AXIS: 64 DEGREES
P-R INTERVAL: 146 MS
Q-T INTERVAL: 388 MS
QRS DURATION: 68 MS
QTC CALCULATION (BEZET): 453 MS
R AXIS: 32 DEGREES
T AXIS: 45 DEGREES
VENTRICULAR RATE: 82 BPM

## 2023-10-26 ENCOUNTER — TELEPHONE (OUTPATIENT)
Dept: INTERNAL MEDICINE CLINIC | Facility: CLINIC | Age: 38
End: 2023-10-26

## 2023-10-26 DIAGNOSIS — Z82.61 FAMILY HISTORY OF RHEUMATOID ARTHRITIS: ICD-10-CM

## 2023-10-26 DIAGNOSIS — H01.9 EYELID INFLAMMATION: Primary | ICD-10-CM

## 2023-10-26 NOTE — TELEPHONE ENCOUNTER
Pt calling with update for CS. She saw [facility] specialists this week who diagnosed her with Raynaud Syndrome. Also stated that she had hyper mobility. They recommended an EDS test and then to rule out a collagen disorder. Are these tests CS would be able to order? Pt will have Jessienhsadiq fax us OV note.

## 2023-11-03 ENCOUNTER — TELEPHONE (OUTPATIENT)
Dept: INTERNAL MEDICINE CLINIC | Facility: CLINIC | Age: 38
End: 2023-11-03

## 2023-11-03 ENCOUNTER — TELEMEDICINE (OUTPATIENT)
Dept: INTERNAL MEDICINE CLINIC | Facility: CLINIC | Age: 38
End: 2023-11-03
Payer: COMMERCIAL

## 2023-11-03 DIAGNOSIS — U07.1 COVID-19: Primary | ICD-10-CM

## 2023-11-03 PROCEDURE — 99213 OFFICE O/P EST LOW 20 MIN: CPT | Performed by: PHYSICIAN ASSISTANT

## 2023-11-03 RX ORDER — CODEINE PHOSPHATE AND GUAIFENESIN 10; 100 MG/5ML; MG/5ML
5 SOLUTION ORAL 3 TIMES DAILY PRN
Qty: 180 ML | Refills: 0 | Status: SHIPPED | OUTPATIENT
Start: 2023-11-03

## 2023-11-03 NOTE — TELEPHONE ENCOUNTER
Tested positive for covid yesterday and has questions,  nasal congestions, chills, headache, can't get out of  bed.  Vaccinated but not the current one.

## 2023-11-03 NOTE — TELEPHONE ENCOUNTER
LOV 10/24/23     Called and spoke w/ pt. Pt thought it was allergies, tested herself last night and was positive for Covid. Symptoms were mild yesterday and are worse today. Pt stated she isn't able to get out of bed. C/o congestion, brain fog, coughing, fatigue, muscle aches. Cough is productive. Feels it in her lungs, denies SOB but \"feels pressure\". Has been taking Tylenol and Zyrtec. Offered VV with CS at 11AM. Pt agreeable and would appreciate appointment. Pt stated her  and kids are not sick as of now, advised if she goes near them to wear mask and wipe down areas of high touch. Advised to hydrate, rest, and treat symptoms with OTC meds. Scheduled VV. Advised on isolation guidelines.     CAROLE CS - Scheduled VV for today

## 2023-11-03 NOTE — PROGRESS NOTES
Elizabeth Lopez is a 41year old female. HPI:    Pt presents with covid. Symptom onset 11/2. Positive home test 11/2. C/o nasal congestion, sinus pressure, cough, body aches, chills, low grade fever. Denies SOB. Taking tylenol and ibuprofen with limited benefit. No known sick contacts. Allergies:    Pcns [Penicillins]      HIVES   Current Meds:  Current Outpatient Medications   Medication Sig Dispense Refill    guaiFENesin-codeine (CHERATUSSIN AC) 100-10 MG/5ML Oral Solution Take 5 mL by mouth 3 (three) times daily as needed for cough. 180 mL 0    amphetamine-dextroamphetamine 20 MG Oral Tab Take 1 tablet (20 mg total) by mouth daily. ondansetron (ZOFRAN) 4 mg tablet Take 1 tablet (4 mg total) by mouth every 8 (eight) hours as needed for Nausea. 30 tablet 0    fluticasone propionate 50 MCG/ACT Nasal Suspension 2 sprays by Each Nare route daily. 1 each 1    amphetamine-dextroamphetamine 5 MG Oral Tab Take 1 tablet (5 mg total) by mouth daily. PMH:     Past Medical History:   Diagnosis Date    ADHD     OTHER DISEASES     Pre-eclampsia     Pregnancy-induced hypertension          ROS:   Review of Systems   Constitutional:  Positive for chills, fatigue and fever. HENT:  Positive for congestion, rhinorrhea, sinus pressure, sinus pain and sore throat. Respiratory:  Positive for cough. Negative for shortness of breath and wheezing. Cardiovascular:  Negative for chest pain. Neurological:  Negative for dizziness, light-headedness and headaches. PHYSICAL EXAM:   No vital signs or physical exam completed for this visit as visit was done via telehealth. ASSESSMENT/ PLAN:   1.  COVID-19  Reviewed isolation guidelines  Continue tylenol and ibuprofen prn   Rest and fluids   Cheratussin prn - advised may cause drowsiness, no taking while driving   To the  if symptoms persist or develops SOB       COVID-19 Vaccine(5 - 2023-24 season) due on 09/01/2023  Influenza Vaccine(1) due on 10/01/2023  Annual Physical due on 11/01/2023      Pt indicates understanding and agrees to the plan. Return if symptoms worsen or fail to improve. Domi Christensen PA-C        Kelly  Tania Orr understands phone evaluation is not a substitute for face-to-face examination or emergency care. Patient advised to go to ER or call 911 for worsening symptoms or acute distress. Please note that the following visit was completed using two-way, real-time interactive audio and video communication. This has been done in good rafael to provide continuity of care in the best interest of the provider-patient relationship, due to the on-going public health crisis/national emergency and because of restrictions of visitation. There are limitations of this visit as no physical exam could be performed. Every conscious effort was taken to allow for sufficient and adequate time. This billing visit was spent on reviewing labs, medications, radiology tests and decision making. Appropriate medical decision-making and tests are ordered as detailed in the plan of care above.

## 2023-11-10 ENCOUNTER — TELEPHONE (OUTPATIENT)
Dept: INTERNAL MEDICINE CLINIC | Facility: CLINIC | Age: 38
End: 2023-11-10

## 2023-11-10 NOTE — TELEPHONE ENCOUNTER
Called and spoke w/ pt. Notified CS stated if improving then should not be contagious after day 10. Can be around her kids and wear a mask to be safe. Notified if symptoms persisting next week, call us and schedule f/u. If any symptoms worsen over the weekend to go to . Pt verbalizes understanding and agreeable to plan.

## 2023-11-10 NOTE — TELEPHONE ENCOUNTER
Telemedicine visit done 11/3/23 with CS    Called and spoke w/ pt. Pt stated she feels better, feels like a cold now. She feels weaker from her baseline. Still having productive cough. Today she is about Day 8/9 of Covid and she is still isolating from her kids. Pt is still testing positive for Covid. Pt is wondering how long she should expect to test positive and what it means regarding spreading the virus if she's still positive. Pt is wondering if she could be by her kids with a mask on or if she should still be isolating. She doesn't want to give it to them as they have gone this long without getting it. She is ok with isolating longer. She wants to know if she's still contagious if she's testing positive. Pt stated she also forgot to mention at VV that cough medicine makes her \"wired\", so she hasn't been taking at night. Pt feels like her cough is not that bad during the day so hasn't taken it in the day time.     CS - Still testing positive, should she still isolate from kids? Is she contagious since testing positive still? Any further recs or advice?

## 2023-11-10 NOTE — TELEPHONE ENCOUNTER
If improving, then should not be contagious after day 10. Can be around her kids and wear a mask to be safe.     See me in the office next week if symptoms continue to persist. To the UC sooner if worse.

## 2023-11-10 NOTE — TELEPHONE ENCOUNTER
Pt had VV w/CS on 11/3 for covid but Still having sinus jose f/productive cough/fatigue/cough at night

## 2023-11-15 ENCOUNTER — OFFICE VISIT (OUTPATIENT)
Dept: RHEUMATOLOGY | Facility: CLINIC | Age: 38
End: 2023-11-15
Payer: COMMERCIAL

## 2023-11-15 ENCOUNTER — LAB ENCOUNTER (OUTPATIENT)
Dept: LAB | Age: 38
End: 2023-11-15
Attending: INTERNAL MEDICINE
Payer: COMMERCIAL

## 2023-11-15 VITALS
BODY MASS INDEX: 25.9 KG/M2 | HEIGHT: 67 IN | SYSTOLIC BLOOD PRESSURE: 110 MMHG | DIASTOLIC BLOOD PRESSURE: 74 MMHG | TEMPERATURE: 98 F | WEIGHT: 165 LBS | HEART RATE: 82 BPM | OXYGEN SATURATION: 99 % | RESPIRATION RATE: 16 BRPM

## 2023-11-15 DIAGNOSIS — I87.2 VENOUS INSUFFICIENCY OF BOTH LOWER EXTREMITIES: ICD-10-CM

## 2023-11-15 DIAGNOSIS — I73.00 RAYNAUD'S SYNDROME: Primary | ICD-10-CM

## 2023-11-15 DIAGNOSIS — I73.00 RAYNAUD'S PHENOMENON WITHOUT GANGRENE: ICD-10-CM

## 2023-11-15 DIAGNOSIS — I73.00 RAYNAUD'S PHENOMENON WITHOUT GANGRENE: Primary | ICD-10-CM

## 2023-11-15 LAB
BASOPHILS # BLD AUTO: 0.04 X10(3) UL (ref 0–0.2)
BASOPHILS NFR BLD AUTO: 0.7 %
C3 SERPL-MCNC: 104 MG/DL (ref 90–180)
C4 SERPL-MCNC: 31.7 MG/DL (ref 10–40)
CREAT UR-SCNC: 176 MG/DL
EOSINOPHIL # BLD AUTO: 0.09 X10(3) UL (ref 0–0.7)
EOSINOPHIL NFR BLD AUTO: 1.6 %
ERYTHROCYTE [DISTWIDTH] IN BLOOD BY AUTOMATED COUNT: 11.7 %
HCT VFR BLD AUTO: 40.6 %
HGB BLD-MCNC: 13.3 G/DL
IMM GRANULOCYTES # BLD AUTO: 0.02 X10(3) UL (ref 0–1)
IMM GRANULOCYTES NFR BLD: 0.4 %
LYMPHOCYTES # BLD AUTO: 1.64 X10(3) UL (ref 1–4)
LYMPHOCYTES NFR BLD AUTO: 29.4 %
MCH RBC QN AUTO: 30.9 PG (ref 26–34)
MCHC RBC AUTO-ENTMCNC: 32.8 G/DL (ref 31–37)
MCV RBC AUTO: 94.2 FL
MONOCYTES # BLD AUTO: 0.41 X10(3) UL (ref 0.1–1)
MONOCYTES NFR BLD AUTO: 7.4 %
NEUTROPHILS # BLD AUTO: 3.37 X10 (3) UL (ref 1.5–7.7)
NEUTROPHILS # BLD AUTO: 3.37 X10(3) UL (ref 1.5–7.7)
NEUTROPHILS NFR BLD AUTO: 60.5 %
PLATELET # BLD AUTO: 304 10(3)UL (ref 150–450)
PROT UR-MCNC: 9.4 MG/DL
PROT/CREAT UR-RTO: 0.05
RBC # BLD AUTO: 4.31 X10(6)UL
THYROGLOB SERPL-MCNC: <15 U/ML (ref ?–60)
THYROPEROXIDASE AB SERPL-ACNC: <28 U/ML (ref ?–60)
VIT B12 SERPL-MCNC: 763 PG/ML (ref 193–986)
VIT D+METAB SERPL-MCNC: 20.3 NG/ML (ref 30–100)
WBC # BLD AUTO: 5.6 X10(3) UL (ref 4–11)

## 2023-11-15 PROCEDURE — 84156 ASSAY OF PROTEIN URINE: CPT

## 2023-11-15 PROCEDURE — 86225 DNA ANTIBODY NATIVE: CPT

## 2023-11-15 PROCEDURE — 86146 BETA-2 GLYCOPROTEIN ANTIBODY: CPT

## 2023-11-15 PROCEDURE — 87522 HEPATITIS C REVRS TRNSCRPJ: CPT

## 2023-11-15 PROCEDURE — 82306 VITAMIN D 25 HYDROXY: CPT

## 2023-11-15 PROCEDURE — 99205 OFFICE O/P NEW HI 60 MIN: CPT | Performed by: INTERNAL MEDICINE

## 2023-11-15 PROCEDURE — 86147 CARDIOLIPIN ANTIBODY EA IG: CPT

## 2023-11-15 PROCEDURE — 3074F SYST BP LT 130 MM HG: CPT | Performed by: INTERNAL MEDICINE

## 2023-11-15 PROCEDURE — 86376 MICROSOMAL ANTIBODY EACH: CPT | Performed by: INTERNAL MEDICINE

## 2023-11-15 PROCEDURE — 84165 PROTEIN E-PHORESIS SERUM: CPT

## 2023-11-15 PROCEDURE — 3078F DIAST BP <80 MM HG: CPT | Performed by: INTERNAL MEDICINE

## 2023-11-15 PROCEDURE — 36415 COLL VENOUS BLD VENIPUNCTURE: CPT

## 2023-11-15 PROCEDURE — 86160 COMPLEMENT ANTIGEN: CPT | Performed by: INTERNAL MEDICINE

## 2023-11-15 PROCEDURE — 86334 IMMUNOFIX E-PHORESIS SERUM: CPT

## 2023-11-15 PROCEDURE — 86800 THYROGLOBULIN ANTIBODY: CPT | Performed by: INTERNAL MEDICINE

## 2023-11-15 PROCEDURE — 86235 NUCLEAR ANTIGEN ANTIBODY: CPT

## 2023-11-15 PROCEDURE — 85610 PROTHROMBIN TIME: CPT

## 2023-11-15 PROCEDURE — 83521 IG LIGHT CHAINS FREE EACH: CPT

## 2023-11-15 PROCEDURE — 85025 COMPLETE CBC W/AUTO DIFF WBC: CPT | Performed by: INTERNAL MEDICINE

## 2023-11-15 PROCEDURE — 82607 VITAMIN B-12: CPT | Performed by: INTERNAL MEDICINE

## 2023-11-15 PROCEDURE — 3008F BODY MASS INDEX DOCD: CPT | Performed by: INTERNAL MEDICINE

## 2023-11-15 PROCEDURE — 82570 ASSAY OF URINE CREATININE: CPT

## 2023-11-15 RX ORDER — MULTIVITAMIN
1 TABLET ORAL DAILY
COMMUNITY

## 2023-11-15 NOTE — PATIENT INSTRUCTIONS
Raynaud's (ray-NOHZ) disease causes some areas of your body - such as your fingers and toes - to feel numb and cold in response to cold temperatures or stress. In Raynaud's disease, smaller arteries that supply blood to your skin narrow, limiting blood circulation to affected areas (vasospasm). Women are more likely than men to have Raynaud's disease, also known as Raynaud or Raynaud's phenomenon or syndrome. It appears to be more common in people who live in colder climates. Treatment of Raynaud's disease depends on its severity and whether you have other health conditions. For most people, Raynaud's disease isn't disabling, but it can affect your quality of life. Symptoms    Raynaud's disease    Signs and symptoms of Raynaud's disease include:    Cold fingers or toes    Color changes in your skin in response to cold or stress    Numb, prickly feeling or stinging pain upon warming or stress relief    During an attack of Raynaud's, affected areas of your skin usually first turn white. Then, they often turn blue and feel cold and numb. As you warm and circulation improves, the affected areas may turn red, throb, tingle or swell. Although Raynaud's most commonly affects your fingers and toes, it can also affect other areas of your body, such as your nose, lips, ears and even nipples. After warming, it can take 15 minutes for normal blood flow to return to the area. When to see a doctor    See your doctor right away if you have a history of severe Raynaud's and develop a sore or infection in one of your affected fingers or toes. Request an Appointment at [address] [place] [address] don't completely understand the cause of Raynaud's attacks, but blood vessels in the hands and feet appear to overreact to cold temperatures or stress.     Blood vessels in spasm    With Raynaud's, arteries to your fingers and toes go into vasospasm when exposed to cold or stress, narrowing your vessels and temporarily limiting blood supply. Over time, these small arteries can thicken slightly, further limiting blood flow. Cold temperatures are most likely to trigger an attack. Exposure to cold, such as putting your hands in cold water, taking something from a freezer or being in cold air, is the most likely trigger. For some people, emotional stress can trigger an episode. Primary vs. secondary Raynaud's    There are two main types of the condition. Primary Raynaud's. Also called Raynaud's disease, this most common form isn't the result of an associated medical condition. It can be so mild that many people with primary Raynaud's don't seek treatment. And it can resolve on its own. Secondary Raynaud's. Also called Raynaud's phenomenon, this form is caused by an underlying problem. Although secondary Raynaud's is less common than the primary form, it tends to be more serious. Signs and symptoms of secondary Raynaud's usually appear around age 36, later than they do for primary Raynaud's. Causes of secondary Raynaud's include:    Connective tissue diseases. Most people who have a rare disease that leads to hardening and scarring of the skin (scleroderma) have Raynaud's.

## 2023-11-16 DIAGNOSIS — E55.9 VITAMIN D DEFICIENCY: Primary | ICD-10-CM

## 2023-11-16 LAB
KAPPA LC FREE SER-MCNC: 2.05 MG/DL (ref 0.33–1.94)
KAPPA LC FREE/LAMBDA FREE SER NEPH: 0.93 {RATIO} (ref 0.26–1.65)
LAMBDA LC FREE SERPL-MCNC: 2.2 MG/DL (ref 0.57–2.63)

## 2023-11-16 RX ORDER — ERGOCALCIFEROL 1.25 MG/1
50000 CAPSULE ORAL WEEKLY
Qty: 12 CAPSULE | Refills: 0 | Status: SHIPPED | OUTPATIENT
Start: 2023-11-16 | End: 2023-12-16

## 2023-11-16 NOTE — TELEPHONE ENCOUNTER
Component  Ref Range & Units 1 d ago   Vitamin D, 25OH, Total  30.0 - 100.0 ng/mL 20.3 Low    Comment: Literature Recommendations for 25(OH)D levels are:  Range           Vitamin D Status   <20    ng/mL      Deficiency   20-<30 ng/mL      Insufficiency    ng/mL      Sufficiency       Vitamin D is low would do high-dose vitamin D 50,000 units once a week for 3 months and then OTC 3 to 4000 IU D3

## 2023-11-16 NOTE — TELEPHONE ENCOUNTER
Sent Zeto message to patient with her results. Asked patient to let us know if she has any questions.  High dose Vitamin D pended to you for approval.

## 2023-11-17 LAB
ALBUMIN SERPL ELPH-MCNC: 4.25 G/DL (ref 3.75–5.21)
ALBUMIN/GLOB SERPL: 1.49 {RATIO} (ref 1–2)
ALPHA1 GLOB SERPL ELPH-MCNC: 0.29 G/DL (ref 0.19–0.46)
ALPHA2 GLOB SERPL ELPH-MCNC: 0.74 G/DL (ref 0.48–1.05)
B-GLOBULIN SERPL ELPH-MCNC: 0.77 G/DL (ref 0.68–1.23)
DSDNA IGG SERPL IA-ACNC: 1.8 IU/ML
ENA RNP IGG SER IA-ACNC: 1.2 U/ML
ENA SM IGG SER IA-ACNC: <0.7 U/ML
ENA SS-A IGG SER IA-ACNC: 0.4 U/ML
ENA SS-B IGG SER IA-ACNC: <0.4 U/ML
GAMMA GLOB SERPL ELPH-MCNC: 1.05 G/DL (ref 0.62–1.7)
PROT SERPL-MCNC: 7.1 G/DL (ref 5.7–8.2)
U1 SNRNP IGG SER IA-ACNC: 1.4 U/ML

## 2023-11-20 LAB
APTT: 29.4 SEC
B2 GLYCOPROT I IGG AB: <9 GPI IGG UNITS
B2 GLYCOPROT I IGM AB: <9 GPI IGM UNITS
CARDIOLIPIN IGG: <9 GPL U/ML
CARDIOLIPIN IGM: 11 MPL U/ML
DRVVT: 43.9 SEC
HEXAGONAL PHASE PHOSPHOLIPID: 11 SEC
INR: 1
PT: 10.9 SEC
THROMBIN TIME: 17.4 SEC

## 2024-05-16 ENCOUNTER — HOSPITAL ENCOUNTER (EMERGENCY)
Facility: HOSPITAL | Age: 39
Discharge: HOME | End: 2024-05-16
Attending: EMERGENCY MEDICINE

## 2024-05-16 ENCOUNTER — APPOINTMENT (OUTPATIENT)
Dept: CT IMAGING | Facility: HOSPITAL | Age: 39
End: 2024-05-16
Attending: EMERGENCY MEDICINE

## 2024-05-16 VITALS
OXYGEN SATURATION: 100 % | TEMPERATURE: 98 F | WEIGHT: 168 LBS | HEART RATE: 94 BPM | HEIGHT: 67.72 IN | RESPIRATION RATE: 18 BRPM | SYSTOLIC BLOOD PRESSURE: 122 MMHG | DIASTOLIC BLOOD PRESSURE: 78 MMHG | BODY MASS INDEX: 25.76 KG/M2

## 2024-05-16 DIAGNOSIS — G43.109 COMPLICATED MIGRAINE: Primary | ICD-10-CM

## 2024-05-16 LAB
ALBUMIN SERPL-MCNC: 4.2 G/DL (ref 3.4–5)
ALBUMIN/GLOB SERPL: 1.2 {RATIO} (ref 1–2)
ALP LIVER SERPL-CCNC: 62 U/L
ALT SERPL-CCNC: 28 U/L
ANION GAP SERPL CALC-SCNC: 6 MMOL/L (ref 0–18)
AST SERPL-CCNC: 25 U/L (ref 15–37)
BASOPHILS # BLD AUTO: 0.06 X10(3) UL (ref 0–0.2)
BASOPHILS NFR BLD AUTO: 0.6 %
BILIRUB SERPL-MCNC: 0.6 MG/DL (ref 0.1–2)
BUN BLD-MCNC: 12 MG/DL (ref 9–23)
CALCIUM BLD-MCNC: 9.1 MG/DL (ref 8.5–10.1)
CHLORIDE SERPL-SCNC: 106 MMOL/L (ref 98–112)
CO2 SERPL-SCNC: 26 MMOL/L (ref 21–32)
CREAT BLD-MCNC: 0.87 MG/DL
EGFRCR SERPLBLD CKD-EPI 2021: 87 ML/MIN/1.73M2 (ref 60–?)
EOSINOPHIL # BLD AUTO: 0.01 X10(3) UL (ref 0–0.7)
EOSINOPHIL NFR BLD AUTO: 0.1 %
ERYTHROCYTE [DISTWIDTH] IN BLOOD BY AUTOMATED COUNT: 11.8 %
GLOBULIN PLAS-MCNC: 3.6 G/DL (ref 2.8–4.4)
GLUCOSE BLD-MCNC: 90 MG/DL (ref 70–99)
HCT VFR BLD AUTO: 41.5 %
HGB BLD-MCNC: 14.1 G/DL
IMM GRANULOCYTES # BLD AUTO: 0.02 X10(3) UL (ref 0–1)
IMM GRANULOCYTES NFR BLD: 0.2 %
LYMPHOCYTES # BLD AUTO: 1.32 X10(3) UL (ref 1–4)
LYMPHOCYTES NFR BLD AUTO: 14.1 %
MCH RBC QN AUTO: 30.7 PG (ref 26–34)
MCHC RBC AUTO-ENTMCNC: 34 G/DL (ref 31–37)
MCV RBC AUTO: 90.2 FL
MONOCYTES # BLD AUTO: 0.38 X10(3) UL (ref 0.1–1)
MONOCYTES NFR BLD AUTO: 4 %
NEUTROPHILS # BLD AUTO: 7.6 X10 (3) UL (ref 1.5–7.7)
NEUTROPHILS # BLD AUTO: 7.6 X10(3) UL (ref 1.5–7.7)
NEUTROPHILS NFR BLD AUTO: 81 %
OSMOLALITY SERPL CALC.SUM OF ELEC: 285 MOSM/KG (ref 275–295)
PLATELET # BLD AUTO: 300 10(3)UL (ref 150–450)
POTASSIUM SERPL-SCNC: 3.8 MMOL/L (ref 3.5–5.1)
PROT SERPL-MCNC: 7.8 G/DL (ref 6.4–8.2)
RBC # BLD AUTO: 4.6 X10(6)UL
SODIUM SERPL-SCNC: 138 MMOL/L (ref 136–145)
WBC # BLD AUTO: 9.4 X10(3) UL (ref 4–11)

## 2024-05-16 PROCEDURE — 93005 ELECTROCARDIOGRAM TRACING: CPT

## 2024-05-16 PROCEDURE — 99284 EMERGENCY DEPT VISIT MOD MDM: CPT

## 2024-05-16 PROCEDURE — 96361 HYDRATE IV INFUSION ADD-ON: CPT

## 2024-05-16 PROCEDURE — 96367 TX/PROPH/DG ADDL SEQ IV INF: CPT

## 2024-05-16 PROCEDURE — 70496 CT ANGIOGRAPHY HEAD: CPT | Performed by: EMERGENCY MEDICINE

## 2024-05-16 PROCEDURE — 85025 COMPLETE CBC W/AUTO DIFF WBC: CPT | Performed by: EMERGENCY MEDICINE

## 2024-05-16 PROCEDURE — 80053 COMPREHEN METABOLIC PANEL: CPT

## 2024-05-16 PROCEDURE — 93010 ELECTROCARDIOGRAM REPORT: CPT

## 2024-05-16 PROCEDURE — 70498 CT ANGIOGRAPHY NECK: CPT | Performed by: EMERGENCY MEDICINE

## 2024-05-16 PROCEDURE — 96375 TX/PRO/DX INJ NEW DRUG ADDON: CPT

## 2024-05-16 PROCEDURE — 96374 THER/PROPH/DIAG INJ IV PUSH: CPT

## 2024-05-16 PROCEDURE — 80053 COMPREHEN METABOLIC PANEL: CPT | Performed by: EMERGENCY MEDICINE

## 2024-05-16 PROCEDURE — 99285 EMERGENCY DEPT VISIT HI MDM: CPT

## 2024-05-16 PROCEDURE — 85025 COMPLETE CBC W/AUTO DIFF WBC: CPT

## 2024-05-16 RX ORDER — KETOROLAC TROMETHAMINE 15 MG/ML
15 INJECTION, SOLUTION INTRAMUSCULAR; INTRAVENOUS ONCE
Status: COMPLETED | OUTPATIENT
Start: 2024-05-16 | End: 2024-05-16

## 2024-05-16 RX ORDER — METOCLOPRAMIDE HYDROCHLORIDE 5 MG/ML
10 INJECTION INTRAMUSCULAR; INTRAVENOUS ONCE
Status: COMPLETED | OUTPATIENT
Start: 2024-05-16 | End: 2024-05-16

## 2024-05-16 RX ORDER — DIPHENHYDRAMINE HYDROCHLORIDE 50 MG/ML
12.5 INJECTION INTRAMUSCULAR; INTRAVENOUS ONCE
Status: COMPLETED | OUTPATIENT
Start: 2024-05-16 | End: 2024-05-16

## 2024-05-16 NOTE — ED INITIAL ASSESSMENT (HPI)
Pt c/o of a sudden onset of a migraine since this morning. Pt c/o of intermittent tingling to her right arm and right leg. Pt c/o of nausea. Pt states that she took tylenol at 1300. Pt took xanax 2 hour PTA. No facial droop noted. Pt has equal arm strength. Pt reports generalized weakness. Clear speech.

## 2024-05-17 LAB
ATRIAL RATE: 72 BPM
P AXIS: 70 DEGREES
P-R INTERVAL: 166 MS
Q-T INTERVAL: 430 MS
QRS DURATION: 80 MS
QTC CALCULATION (BEZET): 470 MS
R AXIS: 50 DEGREES
T AXIS: 51 DEGREES
VENTRICULAR RATE: 72 BPM

## 2024-05-17 NOTE — ED PROVIDER NOTES
Patient Seen in: [facility] Emergency Department      History     Chief Complaint   Patient presents with    Headache     Stated Complaint: migraine for 4 hours, hx of, did not take meds    Subjective:   HPI    Patient for evaluation of migraine headache.    Symptoms started around 1130 reach peak intensity around 130.    Pain at the top of her head.  The right side, goes down the back of her head.  No falls no trauma.    Concerned because she had some paresthesias in her arm and leg.  No gilma numbness or weakness.   no difficulty speaking.  No vision changes.  Has not had the symptoms with her migraines before.            Objective:   Past Medical History:    ADHD    Migraines    OTHER DISEASES    Pre-eclampsia (HCC)    Pregnancy-induced hypertension (HCC)              Past Surgical History:   Procedure Laterality Date    Impact tooth rem bony w/comp Bilateral 2007    wisdom teeth x 4                Social History     Socioeconomic History    Marital status:      Spouse name: Xavier    Number of children: 0    Years of education: 16   Occupational History    Occupation: Product management   Tobacco Use    Smoking status: Never    Smokeless tobacco: Never   Vaping Use    Vaping status: Never Used   Substance and Sexual Activity    Alcohol use: Yes     Alcohol/week: 2.0 standard drinks of alcohol     Types: 2 Standard drinks or equivalent per week     Comment: occasional    Drug use: No    Sexual activity: Not Currently     Partners: Male   Other Topics Concern     Service No    Blood Transfusions No    Occupational Exposure No    Hobby Hazards No    Sleep Concern No    Back Care No    Bike Helmet No    Self-Exams Yes   Social History Narrative    Lives with               Review of Systems    Positive for stated complaint: migraine for 4 hours, hx of, did not take meds  Other systems are as noted in HPI.  Constitutional and vital signs reviewed.      All other systems reviewed and negative  except as noted above.    Physical Exam     ED Triage Vitals [05/16/24 1621]   /78   Pulse 94   Resp 18   Temp 98.4 °F (36.9 °C)   Temp src Temporal   SpO2 100 %   O2 Device None (Room air)       Current Vitals:   Vital Signs  BP: 122/78  Pulse: 94  Resp: 18  Temp: 98.4 °F (36.9 °C)  Temp src: Temporal    Oxygen Therapy  SpO2: 100 %  O2 Device: None (Room air)            Physical Exam    Constitutional:  Appears well-developed and well-nourished.   Head: Normocephalic and atraumatic.   Nose: Nose normal.   Eyes: EOM are normal. Pupils are equal, round, and reactive to light.   Neck: Normal range of motion. Neck supple. No JVD present.   Cardiovascular: Normal rate and regular rhythm.    Pulmonary/Chest: Effort normal and breath sounds normal. No stridor.   Abdominal: Soft. There is no tenderness. There is no guarding.   Musculoskeletal: Exhibits no edema or tenderness.   Neurological: Pt is alert and oriented to person, place, and time.     There is no nystagmus.  There are no cranial nerve deficits.  Speech is fluent and not slurred.  There is no word finding difficulty.     Strength is 5 out of 5 in the upper extremities bilaterally.  Sensation is symmetric in the upper extremities and not diminished.    Normal strength and sensation bilateral lower extremities.      no cerebellar   no meningeal signs    Skin: Skin is warm and dry.   Psychiatric: Normal mood and affect. Thought content normal.       ED Course     Labs Reviewed   COMP METABOLIC PANEL (14) - Normal   CBC WITH DIFFERENTIAL WITH PLATELET    Narrative:     The following orders were created for panel order CBC With Differential With Platelet.  Procedure                               Abnormality         Status                     ---------                               -----------         ------                     CBC W/ DIFFERENTIAL[[id number]]                              Final result                 Please view results for these tests on the  individual orders.   RAINBOW DRAW LAVENDER   RAINBOW DRAW LIGHT GREEN   RAINBOW DRAW BLUE   CBC W/ DIFFERENTIAL     EKG    Rate, intervals and axes as noted on EKG Report.  Rate: 72  Rhythm: Sinus Rhythm  Reading: Sinus rhythm no acute ischemia                 CTA BRAIN + CTA CAROTIDS (CPT=70496/13160)    Result Date: 5/16/2024  CONCLUSION:  Unremarkable CT angiogram head and neck examination.   LOCATION:  [place] [address]   Dictated by (CST): Darwin Espana MD on 5/16/2024 at 8:23 PM     Finalized by (CST): Darwin Espana MD on 5/16/2024 at 8:28 PM          Medications   ketorolac (Toradol) 15 MG/ML injection 15 mg (15 mg Intravenous Given 5/16/24 1930)   metoclopramide (Reglan) 5 mg/mL injection 10 mg (10 mg Intravenous Given 5/16/24 1930)   diphenhydrAMINE (Benadryl) 50 mg/mL  injection 12.5 mg (12.5 mg Intravenous Given 5/16/24 1930)   sodium chloride 0.9 % IV bolus 1,000 mL (0 mL Intravenous Stopped 5/16/24 2119)   iopamidol 76% (ISOVUE-370) injection for power injector (75 mL Intravenous Given 5/16/24 2000)              MDM          Differential diagnoses considered: Complicated migraine, intracranial hemorrhage, vertebral dissection, subarachnoid hemorrhage all considered    -Patient was given the above medications and her symptoms resolved entirely.  -She never had any neurological deficits on exam other than paresthesias did not really endorse any deficits earlier either.  However I think because of her presentation is reasonable for her to pursue an MRI as an outpatient.    -Outpatient follow-up with PCP and/or neurology.  I did also order the outpatient MRI, I have told her she can call and have the schedule as long as it is covered by her insurance.      I visualized the radiology studies, my independent interpretation: No intracranial hemorrhage noted on CT scan of brain    *Discussion of ongoing management of this patient's care included: n/a  *Comorbidities contributing to the complexity of decision  making: n/a  *External charts reviewed: n/a  *Additional sources of history: n/a    Shared decision making was done by: patient, myself.                                     Medical Decision Making      Disposition and Plan     Clinical Impression:  1. Complicated migraine         Disposition:  Discharge  5/16/2024  9:36 pm    Follow-up:  Arin Santos MD  [address]  [place] [id number]  [phone]    Call in 1 day(s)      Geri Eubanks DO  [address] [facility] [address]  [place] [address]  [phone]    Follow up            Medications Prescribed:  Discharge Medication List as of 5/16/2024  9:37 PM        START taking these medications    Details   LORazepam 0.5 MG Oral Tab Historical

## 2024-05-23 ENCOUNTER — TELEPHONE (OUTPATIENT)
Dept: INTERNAL MEDICINE CLINIC | Facility: CLINIC | Age: 39
End: 2024-05-23

## 2024-05-23 NOTE — TELEPHONE ENCOUNTER
Called and spoke to pt. Informed her of MRI order and provided number to central scheduling so she can schedule MRI. Gave her number to [facility] and advised her to call them to see if they have sooner availability. She verbalized understanding, states she will call to schedule

## 2024-05-23 NOTE — TELEPHONE ENCOUNTER
It looks like there is an order for an MRI in the system. Please give her the phone number to schedule this. Agree with [facility]. May need to be put on a wait list for sooner appt. We can discuss further at ov.

## 2024-05-23 NOTE — TELEPHONE ENCOUNTER
Patient not currently scheduled with Neuro, first available is Aug.  Advise to check with [facility]?  Discuss at ER f/u appointment on 5/29?  Routing to Anna Jaimes for review and recommendations.

## 2024-05-23 NOTE — TELEPHONE ENCOUNTER
FYI:    Anusha Cho  Called for a referral for a NEW concern of neurologist-mri scan needed.     Patient is scheduled for hospital follow up with Anna Jaimes on 5/29/24. Routing to triage for assistance    Patient requesting referral to Neurology and is scheduled for an appointment.      *Patient states that she was in the ER and was informed to get an MRI at a neurologist office within the next 2 weeks. Patient is unable to get into an appointment until August, so is looking for a referral to somewhere else that can get her in earlier.

## 2024-05-28 ENCOUNTER — HOSPITAL ENCOUNTER (OUTPATIENT)
Dept: MRI IMAGING | Age: 39
Discharge: HOME | End: 2024-05-28
Attending: EMERGENCY MEDICINE

## 2024-05-28 DIAGNOSIS — G43.109 COMPLICATED MIGRAINE: ICD-10-CM

## 2024-05-28 PROCEDURE — 70551 MRI BRAIN STEM W/O DYE: CPT | Performed by: EMERGENCY MEDICINE

## 2024-05-29 ENCOUNTER — OFFICE VISIT (OUTPATIENT)
Dept: INTERNAL MEDICINE CLINIC | Facility: CLINIC | Age: 39
End: 2024-05-29

## 2024-05-29 VITALS
RESPIRATION RATE: 18 BRPM | HEART RATE: 75 BPM | OXYGEN SATURATION: 98 % | BODY MASS INDEX: 26.06 KG/M2 | HEIGHT: 67.72 IN | WEIGHT: 170 LBS | TEMPERATURE: 98 F | DIASTOLIC BLOOD PRESSURE: 76 MMHG | SYSTOLIC BLOOD PRESSURE: 112 MMHG

## 2024-05-29 DIAGNOSIS — G43.109 MIGRAINE WITH AURA AND WITHOUT STATUS MIGRAINOSUS, NOT INTRACTABLE: ICD-10-CM

## 2024-05-29 DIAGNOSIS — G93.5 CHIARI MALFORMATION TYPE I (HCC): Primary | ICD-10-CM

## 2024-05-29 PROCEDURE — 99214 OFFICE O/P EST MOD 30 MIN: CPT | Performed by: PHYSICIAN ASSISTANT

## 2024-05-29 RX ORDER — ONDANSETRON 4 MG/1
4 TABLET, FILM COATED ORAL EVERY 8 HOURS PRN
Qty: 30 TABLET | Refills: 0 | Status: SHIPPED | OUTPATIENT
Start: 2024-05-29

## 2024-05-29 RX ORDER — RIZATRIPTAN BENZOATE 10 MG/1
TABLET ORAL
Qty: 9 TABLET | Refills: 1 | Status: SHIPPED | OUTPATIENT
Start: 2024-05-29

## 2024-05-29 NOTE — PROGRESS NOTES
Anusha Cho is a 39 year old female.   Chief Complaint   Patient presents with    Hospital F/U    Test Results     HPI:    Pt presents for ER f/u.   She was seen in the ER on 5/16/24 due to headache with new features. She has a h/o migraines which began around 2016 before she had children. Then migraines seemed to improve during her pregnancies. Now migraines have returned over the past 1-1.5 years.   Migraines typically start with a visual aura and then develops pain shortly after the aura. She also developed photosensitivity and nausea. Migraines usually resolve with excedrin migraine and/or sleep.   The migraine on 5/16/24 began with difficulty writing a text to her co-worker. She states she felt like she could not appropriately form the text. She also felt like she was talking slowly and having trouble expressing herself. She then developed tingling in her right arm so she proceeded to the ER. She was treated with a migraine cocktail and symptoms resolved. CTA brain and carotids done in the ER was normal. She was discharged with an order for a brain MRI and recommendations to see neuro.   MRI was completed yesterday and this shows chiari malformation type 1.   She c/o mild headaches since discharge from the ER but none that she would classify as a migraine.     Allergies:  Allergies   Allergen Reactions    Pcns [Penicillins] HIVES      Current Meds:  Current Outpatient Medications   Medication Sig Dispense Refill    Rizatriptan Benzoate (MAXALT) 10 MG Oral Tab Take 1 tab at onset of migraine. May repeat dose in 2 hours if needed. *max 2 doses in 24 hours* 9 tablet 1    ondansetron (ZOFRAN) 4 mg tablet Take 1 tablet (4 mg total) by mouth every 8 (eight) hours as needed for Nausea. 30 tablet 0    LORazepam 0.5 MG Oral Tab       Multiple Vitamin Oral Tab Take 1 tablet by mouth daily.      amphetamine-dextroamphetamine 20 MG Oral Tab Take 1 tablet (20 mg total) by mouth daily.          PMH:     Past Medical  History:    ADHD    Migraines    OTHER DISEASES    Pre-eclampsia (HCC)    Pregnancy-induced hypertension (HCC)       ROS:   GENERAL: Negative for fever, chills and fatigue. NAD.  HENT: Negative for congestion, sore throat, and ear pain.  RESPIRATORY: Negative for cough, chest tightness, shortness of breath and wheezing.    CV: Negative for chest pain, palpitations and leg swelling.   GI: Negative for nausea, vomiting, abdominal pain, diarrhea, and blood in stool.   : Negative for dysuria, hematuria and difficulty urinating.   MUSCULOSKELETAL: Negative for myalgias, back pain, joint swelling, arthralgias and gait problem.   NEURO: +tingling, +HA  PSYCH: The patient is not nervous/anxious. No depression.      PHYSICAL EXAM:    /76   Pulse 75   Temp 97.6 °F (36.4 °C) (Temporal)   Resp 18   Ht 5' 7.72\" (1.72 m)   Wt 170 lb (77.1 kg)   LMP 04/29/2024 (Exact Date)   SpO2 98%   BMI 26.06 kg/m²     GENERAL: NAD. A&Ox3  EYES: PERRL, EOMI, sclera clear  HEENT: Ear canals clear, TMs pearly gray bilaterally. Throat without erythema or exudates.  RESPIRATORY: CTAB, no R/R/W  CV: RRR, no murmurs.   NEURO: No focal deficits.   PSYCH: Appropriate mood and affect.      ASSESSMENT/ PLAN:   1. Chiari malformation type I (HCC)  - Neurosurgery Referral - In Network    2. Migraine with aura and without status migrainosus, not intractable  - ondansetron (ZOFRAN) 4 mg tablet; Take 1 tablet (4 mg total) by mouth every 8 (eight) hours as needed for Nausea.  Dispense: 30 tablet; Refill: 0     Unclear if HAs are due to the chiari malformation and/or migraines  Most recent symptoms showed new characteristics (confusion, difficulty with speech, etc) and this needs to be further evaluated by neuro  Can try maxalt prn for migraines - reviewed instructions for use and possible side effects  Will likely need to see neurosurg as well regarding the chiari malformation   Advised to return to the ER with new neuro changes, severe HA,  intractable pain, worst HA of her life, etc.     Health Maintenance Due   Topic Date Due    COVID-19 Vaccine (5 - 2023-24 season) 09/01/2023    Annual Physical  11/01/2023    Annual Depression Screening  01/01/2024           Pt indicates understanding and agrees to the plan.     No follow-ups on file.    Anna Jaimes PA-C

## 2024-06-04 ENCOUNTER — NURSE TRIAGE (OUTPATIENT)
Dept: INTERNAL MEDICINE CLINIC | Facility: CLINIC | Age: 39
End: 2024-06-04

## 2024-06-04 ENCOUNTER — HOSPITAL ENCOUNTER (OUTPATIENT)
Age: 39
Discharge: HOME | End: 2024-06-04
Payer: COMMERCIAL

## 2024-06-04 ENCOUNTER — OFFICE VISIT (OUTPATIENT)
Dept: NEUROLOGY | Facility: CLINIC | Age: 39
End: 2024-06-04
Payer: COMMERCIAL

## 2024-06-04 ENCOUNTER — TELEPHONE (OUTPATIENT)
Dept: NEUROLOGY | Facility: CLINIC | Age: 39
End: 2024-06-04

## 2024-06-04 VITALS
OXYGEN SATURATION: 98 % | TEMPERATURE: 99 F | SYSTOLIC BLOOD PRESSURE: 126 MMHG | RESPIRATION RATE: 18 BRPM | DIASTOLIC BLOOD PRESSURE: 87 MMHG | HEART RATE: 91 BPM

## 2024-06-04 VITALS
BODY MASS INDEX: 26.06 KG/M2 | RESPIRATION RATE: 16 BRPM | HEART RATE: 99 BPM | SYSTOLIC BLOOD PRESSURE: 120 MMHG | HEIGHT: 67.72 IN | DIASTOLIC BLOOD PRESSURE: 78 MMHG | WEIGHT: 170 LBS

## 2024-06-04 DIAGNOSIS — G93.5 CHIARI MALFORMATION TYPE I (HCC): ICD-10-CM

## 2024-06-04 DIAGNOSIS — J01.10 ACUTE FRONTAL SINUSITIS, RECURRENCE NOT SPECIFIED: ICD-10-CM

## 2024-06-04 DIAGNOSIS — R09.81 CONGESTION OF NASAL SINUS: Primary | ICD-10-CM

## 2024-06-04 DIAGNOSIS — G43.109 MIGRAINE WITH AURA AND WITHOUT STATUS MIGRAINOSUS, NOT INTRACTABLE: Primary | ICD-10-CM

## 2024-06-04 LAB
BILIRUB UR QL STRIP: NEGATIVE
CLARITY UR: CLEAR
COLOR UR: YELLOW
GLUCOSE UR STRIP-MCNC: NEGATIVE MG/DL
HGB UR QL STRIP: NEGATIVE
KETONES UR STRIP-MCNC: NEGATIVE MG/DL
LEUKOCYTE ESTERASE UR QL STRIP: NEGATIVE
NITRITE UR QL STRIP: NEGATIVE
PH UR STRIP: 6 [PH]
POCT INFLUENZA A: NEGATIVE
POCT INFLUENZA B: NEGATIVE
PROT UR STRIP-MCNC: NEGATIVE MG/DL
S PYO AG THROAT QL: NEGATIVE
SARS-COV-2 RNA RESP QL NAA+PROBE: NOT DETECTED
SP GR UR STRIP: 1.01
UROBILINOGEN UR STRIP-ACNC: <2 MG/DL

## 2024-06-04 PROCEDURE — 87880 STREP A ASSAY W/OPTIC: CPT | Performed by: PHYSICIAN ASSISTANT

## 2024-06-04 PROCEDURE — U0002 COVID-19 LAB TEST NON-CDC: HCPCS | Performed by: PHYSICIAN ASSISTANT

## 2024-06-04 PROCEDURE — 81002 URINALYSIS NONAUTO W/O SCOPE: CPT | Performed by: PHYSICIAN ASSISTANT

## 2024-06-04 PROCEDURE — 99214 OFFICE O/P EST MOD 30 MIN: CPT | Performed by: PHYSICIAN ASSISTANT

## 2024-06-04 PROCEDURE — 99204 OFFICE O/P NEW MOD 45 MIN: CPT | Performed by: STUDENT IN AN ORGANIZED HEALTH CARE EDUCATION/TRAINING PROGRAM

## 2024-06-04 PROCEDURE — 87502 INFLUENZA DNA AMP PROBE: CPT | Performed by: PHYSICIAN ASSISTANT

## 2024-06-04 RX ORDER — RIMEGEPANT SULFATE 75 MG/75MG
75 TABLET, ORALLY DISINTEGRATING ORAL AS NEEDED
Qty: 8 TABLET | Refills: 11 | Status: SHIPPED | OUTPATIENT
Start: 2024-06-04 | End: 2025-06-04

## 2024-06-04 RX ORDER — FLUTICASONE PROPIONATE 50 MCG
2 SPRAY, SUSPENSION (ML) NASAL DAILY
Qty: 16 G | Refills: 0 | Status: SHIPPED | OUTPATIENT
Start: 2024-06-04

## 2024-06-04 RX ORDER — BENZONATATE 100 MG/1
100 CAPSULE ORAL 3 TIMES DAILY PRN
Qty: 30 CAPSULE | Refills: 0 | Status: SHIPPED | OUTPATIENT
Start: 2024-06-04 | End: 2024-07-04

## 2024-06-04 RX ORDER — OXYMETAZOLINE HYDROCHLORIDE 0.05 G/100ML
1 SPRAY NASAL 2 TIMES DAILY
Qty: 15 ML | Refills: 0 | Status: SHIPPED | OUTPATIENT
Start: 2024-06-04 | End: 2024-07-04

## 2024-06-04 RX ORDER — ALBUTEROL SULFATE 90 UG/1
2 AEROSOL, METERED RESPIRATORY (INHALATION)
Qty: 1 EACH | Refills: 0 | Status: SHIPPED | OUTPATIENT
Start: 2024-06-04 | End: 2024-07-04

## 2024-06-04 RX ORDER — CETIRIZINE HYDROCHLORIDE, PSEUDOEPHEDRINE HYDROCHLORIDE 5; 120 MG/1; MG/1
1 TABLET, FILM COATED, EXTENDED RELEASE ORAL 2 TIMES DAILY
Qty: 30 TABLET | Refills: 0 | Status: SHIPPED | OUTPATIENT
Start: 2024-06-04

## 2024-06-04 NOTE — ED INITIAL ASSESSMENT (HPI)
Fatigue runny nose and congestion since Sunday  Now having right sided lumbar back pain  Headache and diarrhea

## 2024-06-04 NOTE — TELEPHONE ENCOUNTER
Reason for Disposition   Patient wants to be seen    Protocols used: Cough-A-OH    Action Requested: Summary for Provider     []  Critical Lab, Recommendations Needed  [] Need Additional Advice  [x]   FYI    []   Need Orders  [] Need Medications Sent to Pharmacy  []  Other     SUMMARY: Pt called stating she is \"not feeling great\". C/o congestion, body aches, cough, trouble sleeping, itchy throat and mild diarrhea. Denies fever, max temp yesterday was 99. Pt is concerned because she is having a shooting pain in her lower back and joints. Inconsistent pain, however when it's shooting, pain level 8/10. Taking Tylenol with no relief. Symptoms started Sunday. Covid negative yesterday. Pt stated she is feeling worse today. Offered apt this afternoon with TB. Pt able to come in and is appreciative of call. Advised pt to wear a mask to office.     Reason for call: Upper Respiratory Infection  Onset: Data Unavailable      Future Appointments   Date Time Provider Department Center   6/4/2024  2:15 PM Elizabeth Elias DO [facility]   6/4/2024  3:40 PM Arin Santos MD [facility] [address] [facility]   6/28/2024  9:00 AM Chirag Morse MD ENINAPER2 [facility] [place]   11/18/2024  9:40 AM Trang Gant MD [id number] [address]     FYI TB

## 2024-06-04 NOTE — H&P
Neurology New Office Visit    Anusha Cho   Date of Birth 2/14/1985    Subjective:  Anusha Cho is a(n) 39 year old female who is referred for headaches.     Per Anusha, she was getting bad migraines ~2017. Saw a neurologist then. Treated with topamax for migraine with aura, very labiel emotions. Stopped topamax. Then got pregnant. Migraines improved. Has ahd two kids since then, most recent 2021. After second kid has had recurrence of headaches. Initially once per month now increasing inf requency. In may bad migraine, would stop.     Migraine history:   -Onset: 2017, Saw Dr. Perez 17, improved with pregnancy. Recurred 2021 after second kid  -Frequency: once per month, disabling. Previously once every three months. Also notes many regular headache every 2-3 days, tension type tight band around head. Had period of positional dizziness, improved with sitting down, prior to increase in frequency. Some vertigo even with lying down.   -Timing: more in the afternoon, rare in morning. Snores at night when allergies flaring.   -Character: migraines- throbbing. Regular headache- tight band.   -Triggers: computers, dehydration. Spontanoeus at times. No clear positional trigger.   -Associated symptoms:  photophobia,  phonophobia, yes vision aura, no positional, nausea, vomiting, generalized weakness, history of rare right numbness/tingling (right arm and leg), coordination. No change with valsalva.   -Headache for which she went to ED: Has had some tingling down right arm and leg, some speech issues (took 30 minutes to write a text, wrods were flipped in weird order, punctuation was off. Spouse told her she was making sense but was very delayed.   -Current meds:   -Abortive: excedrin migraine- helps some, can dull migraines but not all, zofran  -Preventive: none  -Past meds:   -Abortive: ibuprofen, doesn't help. Tylenol, doesn't help.   -Preventive: topamax- very emotional.   -Non pharm lying down  -Disabling yes,  has been to ED. Last for hours     Per ED chart review, treated with migraine cocktail. CTA head and neck unrevealing. Advised outpatient follow up.     Problem List:  Patient Active Problem List   Diagnosis    Migraine with aura and without status migrainosus, not intractable    Acute midline low back pain without sciatica    Upper back pain on right side    Acute maxillary sinusitis    Raynaud's phenomenon without gangrene    Venous insufficiency of both lower extremities       PMHx:  Past Medical History:    ADHD    Migraines    OTHER DISEASES    Pre-eclampsia (HCC)    Pregnancy-induced hypertension (HCC)       PSHx:  Past Surgical History:   Procedure Laterality Date    Impact tooth rem bony w/comp Bilateral 2007    wisdom teeth x 4       SocHx:  Social History     Socioeconomic History    Marital status:      Spouse name: Xavier    Number of children: 0    Years of education: 16   Occupational History    Occupation: Product management   Tobacco Use    Smoking status: Never    Smokeless tobacco: Never   Vaping Use    Vaping status: Never Used   Substance and Sexual Activity    Alcohol use: Yes     Alcohol/week: 2.0 standard drinks of alcohol     Types: 2 Standard drinks or equivalent per week     Comment: occasional    Drug use: No    Sexual activity: Not Currently     Partners: Male   Other Topics Concern     Service No    Blood Transfusions No    Caffeine Concern Yes     Comment: 1-2 cup coffee daily    Occupational Exposure No    Hobby Hazards No    Sleep Concern No    Back Care No    Exercise No    Bike Helmet No    Self-Exams Yes   Social History Narrative    Lives with        Family History:  Family History   Problem Relation Age of Onset    Hypertension Mother     High Cholesterol Mother     Arthritis Mother         RA    Hypertension Maternal Grandmother     High Cholesterol Maternal Grandmother     Diabetes Maternal Grandmother     Dementia Maternal Grandmother     Arthritis Maternal  Grandmother         RA    Allergies Sister     Heart Disease Maternal Grandfather     Cancer Paternal Grandfather         Pancreatic    Hypertension Maternal Aunt     High Cholesterol Maternal Aunt     Diabetes Maternal Aunt     Other (Prostate Cancer) Maternal Aunt         Pat uncle    Arthritis Maternal Aunt         RA    Arthritis Maternal Cousin Female         RA       Current Medications:  Current Outpatient Medications   Medication Sig Dispense Refill    Rimegepant Sulfate (NURTEC) 75 MG Oral Tablet Dispersible Take 75 mg by mouth as needed. Take one tablet at onset of migraine.  Maximum dose in 24 hours is 1 tablet (75mg). 8 tablet 11    ondansetron (ZOFRAN) 4 mg tablet Take 1 tablet (4 mg total) by mouth every 8 (eight) hours as needed for Nausea. 30 tablet 0    LORazepam 0.5 MG Oral Tab Take 1 tablet (0.5 mg total) by mouth daily as needed.      Multiple Vitamin Oral Tab Take 1 tablet by mouth daily.      amphetamine-dextroamphetamine 20 MG Oral Tab Take 1 tablet (20 mg total) by mouth daily.      benzonatate 100 MG Oral Cap Take 1 capsule (100 mg total) by mouth 3 (three) times daily as needed for cough. 30 capsule 0    fluticasone propionate 50 MCG/ACT Nasal Suspension 2 sprays by Nasal route daily. 16 g 0    oxymetazoline 0.05 % Nasal Solution 1 spray by Nasal route 2 (two) times daily. 15 mL 0    cetirizine-pseudoephedrine ER 5-120 MG Oral Tablet 12 Hr Take 1 tablet by mouth 2 (two) times daily. 30 tablet 0    albuterol 108 (90 Base) MCG/ACT Inhalation Aero Soln Inhale 2 puffs into the lungs every 4 to 6 hours as needed for Wheezing. 1 each 0     Objective/Physical Exam:    Vital Signs:  Blood pressure 120/78, pulse 99, resp. rate 16, height 67.72\", weight 170 lb (77.1 kg), last menstrual period 05/29/2024, not currently breastfeeding.  General: Alert, good recall of personal medical history    Respiratory: Non labored breathing on room air    Cardiovascular: Regular rate    Mental status: Alert,  oriented, language fluent, comprehension intact    Cranial nerves: Optic disc margins sharp VF full to confrontation bilaterally. Pupils equal, round, equally reactive to light and accommodation. Extraocular eye movements intact without nystagmus. Face sensation intact to light touch. Face strength symmetric and intact. No dysarthria.    Motor:   Power:   -Right upper extremity: shoulder abductors 5, elbow extensors 5, elbow flexors 5, wrist extensors 5, finger extension 5  -Left upper extremity: shoulder abductors 5, elbow extensors 5, elbow flexors 5, wrist extensors 5, finger extension 5  -Right lower extremity: hip flexion 5, knee extension 5, dorsiflexion 5  -Left lower extremity: hip flexion 5, knee extension 5, dorsiflexion 5  Tone: Normal   Bulk: Normal  Abnormal movements: None    Sensory: Intact to light touch in distal extremities.    Coordination: Finger to nose and heel to shin intact.    Reflexes: Right/Left: Biceps 2/2, triceps 2/2, brachioradialis 2/2, hoffmans negative. Patella 2/2, achilles 2/2, plantars downgoing.    Gait: Narrow based, symmetric arm swing, no gait assist.      Labs:     Component      Latest Ref Rn 5/16/2024   WBC      4.0 - 11.0 x10(3) uL 9.4    RBC      3.80 - 5.30 x10(6)uL 4.60    Hemoglobin      12.0 - 16.0 g/dL 14.1    Hematocrit      35.0 - 48.0 % 41.5    Platelet Count      150.0 - 450.0 10(3)uL 300.0    MCV      80.0 - 100.0 fL 90.2    MCH      26.0 - 34.0 pg 30.7    MCHC      31.0 - 37.0 g/dL 34.0    RDW      % 11.8    Prelim Neutrophil Abs      1.50 - 7.70 x10 (3) uL 7.60    Neutrophils Absolute      1.50 - 7.70 x10(3) uL 7.60    Lymphocytes Absolute      1.00 - 4.00 x10(3) uL 1.32    Monocytes Absolute      0.10 - 1.00 x10(3) uL 0.38    Eosinophils Absolute      0.00 - 0.70 x10(3) uL 0.01    Basophils Absolute      0.00 - 0.20 x10(3) uL 0.06    Immature Granulocyte Absolute      0.00 - 1.00 x10(3) uL 0.02    Neutrophils %      % 81.0    Lymphocytes %      % 14.1     Monocytes %      % 4.0    Eosinophils %      % 0.1    Basophils %      % 0.6    Immature Granulocyte %      % 0.2    Glucose      70 - 99 mg/dL 90    Sodium      136 - 145 mmol/L 138    Potassium      3.5 - 5.1 mmol/L 3.8    Chloride      98 - 112 mmol/L 106    Carbon Dioxide, Total      21.0 - 32.0 mmol/L 26.0    ANION GAP      0 - 18 mmol/L 6    BUN      9 - 23 mg/dL 12    CREATININE      0.55 - 1.02 mg/dL 0.87    CALCIUM      8.5 - 10.1 mg/dL 9.1    CALCULATED OSMOLALITY      275 - 295 mOsm/kg 285    EGFR      >=60 mL/min/1.73m2 87    AST (SGOT)      15 - 37 U/L 25    ALT (SGPT)      13 - 56 U/L 28    ALKALINE PHOSPHATASE      37 - 98 U/L 62    Total Bilirubin      0.1 - 2.0 mg/dL 0.6    PROTEIN, TOTAL      6.4 - 8.2 g/dL 7.8    Albumin      3.4 - 5.0 g/dL 4.2    Globulin      2.8 - 4.4 g/dL 3.6    A/G Ratio      1.0 - 2.0  1.2      Imaging:  MRI BRAIN (CPT=70551)    Result Date: 5/28/2024  CONCLUSION:  1. Chiari I malformation with low-lying cerebellar tonsil 6.2 mm below the foramen magnum.  2. Otherwise normal MRI of the brain.  No acute intracranial abnormalities identified. 3. Mild maxillary and ethmoid sinus mucosal changes    Dictated by (CST): Luther Sharma MD on 5/28/2024 at 9:44 AM     Finalized by (CST): Luther Sharma MD on 5/28/2024 at 9:53 AM          CTA BRAIN + CTA CAROTIDS (CPT=70496/03502)    Result Date: 5/16/2024  CONCLUSION:  Unremarkable CT angiogram head and neck examination.   LOCATION:  [id number]   Dictated by (CST): Darwin Espana MD on 5/16/2024 at 8:23 PM     Finalized by (CST): Darwin Espana MD on 5/16/2024 at 8:28 PM          Assessment:  Anusha Cho is a(n) 39 year old female who is referred for headaches. Headaches semiology consistent with migraine with aura. One episode with paresthesias right arm and leg as well as some trouble with reading and delayed speech, concerning for complex migraine. No change in headache with valsalva. Reports period of  prolonged vertigo in past. MR brain with chiari 1 low lying cerebellar tonsils. CTA head and neck unrevealing. Will treat symptomatically for migraine with aura as below. Will image spine and send to neurosurgery for chiari.     Plan:  -Migraine:    -Abortive: Nurtec, will ask nursing to submit prior auth   -Preventive: Trial riboflavin (vitamin B2) 400mg by mouth once daily and magnesium oxide 600mg by mouth once daily (magnesium may cause loose or dark stools). Lifestyle modifications that may help reduce your headaches include good sleep hygiene, regular meals, and avoiding foods that can precipitate a migraine headache (dark chocolate, red wine, etc.)  -MRI cervical, thoracic, lumbar with and wihtout contrast   -Urine preg before  -Dr. Morse, neurosurgery as planned    1. Migraine with aura and without status migrainosus, not intractable    2. Chiari malformation type I (HCC)  - MRI SPINE CERVICAL (W+WO) (CPT=72156) [[id number]]; Future  - MRI SPINE THORACIC (W+WO) (CPT=72157) [[id number]]; Future  - MRI SPINE LUMBAR (W+WO) (CPT=72158) [[id number]]; Future  - Pregnancy Test, Urine    Total time 47 minutes including chart review, eliciting history, physical exam, and counseling.     Elizabeth Elias, DO

## 2024-06-04 NOTE — PATIENT INSTRUCTIONS
-Migraine:    -Abortive: Nurtec, will ask nursing to submit prior auth   -Preventive: Trial riboflavin (vitamin B2) 400mg by mouth once daily and magnesium oxide 600mg by mouth once daily (magnesium may cause loose or dark stools). Lifestyle modifications that may help reduce your headaches include good sleep hygiene, regular meals, and avoiding foods that can precipitate a migraine headache (dark chocolate, red wine, etc.)  -MRI cervical, thoracic, lumbar with and wihtout contrast   -Urine preg before  -Dr. Morse, neurosurgery      Refill policies:    Allow 2-3 business days for refills; controlled substances may take longer.  Contact your pharmacy at least 5 days prior to running out of medication and have them send an electronic request or submit request through the “request refill” option in your get[employer] account.  Refills are not addressed on weekends; covering physicians do not authorize routine medications on weekends.  No narcotics or controlled substances are refilled after noon on Fridays or by on call physicians.  By law, narcotics must be electronically prescribed.  A 30 day supply with no refills is the maximum allowed.  If your prescription is due for a refill, you may be due for a follow up appointment.  To best provide you care, patients receiving routine medications need to be seen at least once a year.  Patients receiving narcotic/controlled substance medications need to be seen at least once every 3 months.  In the event that your preferred pharmacy does not have the requested medication in stock (e.g. Backordered), it is your responsibility to find another pharmacy that has the requested medication available.  We will gladly send a new prescription to that pharmacy at your request.    Scheduling Tests:    If your physician has ordered radiology tests such as MRI or CT scans, please contact Central Scheduling at [phone] right away to schedule the test.  Once scheduled, the [facility] Centralized  Referral Team will work with your insurance carrier to obtain pre-certification or prior authorization.  Depending on your insurance carrier, approval may take 3-10 days.  It is highly recommended patients assure they have received an authorization before having a test performed.  If test is done without insurance authorization, patient may be responsible for the entire amount billed.      Precertification and Prior Authorizations:  If your physician has recommended that you have a procedure or additional testing performed the [facility] Centralized Referral Team will contact your insurance carrier to obtain pre-certification or prior authorization.    You are strongly encouraged to contact your insurance carrier to verify that your procedure/test has been approved and is a COVERED benefit.  Although the [facility] Centralized Referral Team does its due diligence, the insurance carrier gives the disclaimer that \"Although the procedure is authorized, this does not guarantee payment.\"    Ultimately the patient is responsible for payment.   Thank you for your understanding in this matter.  Paperwork Completion:  If you require FMLA or disability paperwork for your recovery, please make sure to either drop it off or have it faxed to our office at [phone]. Be sure the form has your name and date of birth on it.  The form will be faxed to our Forms Department and they will complete it for you.  There is a 25$ fee for all forms that need to be filled out.  Please be aware there is a 10-14 day turnaround time.  You will need to sign a release of information (SATNAM) form if your paperwork does not come with one.  You may call the Forms Department with any questions at [phone].  Their fax number is [phone].

## 2024-06-04 NOTE — ED PROVIDER NOTES
Patient Seen in: [facility]mediate [facility]      History     Chief Complaint   Patient presents with    Cold     Headache, back pain, congestion, runny nose, diarrhea - Entered by patient     Stated Complaint: Cold - Headache, back pain, congestion, runny nose, diarrhea x Sunday    Subjective:   HPI  Anusha Cho is a 39 year old female presents with acute onset of URI symptoms x 4 days. Patient reports  sinus congestion, non productive cough, rhinorrhea.  Additionally patient reports intermittent right sided flank pain that is pleuritic in nature.  Patient denies urinary frequency, urgency, dysuria, hematuria, chest pain, shortness of breath no use of OCPs.  Dysphagia, throat pain, ear pain,  fevers, chills, shortness of breath, respiratory distress, stridor, neck pain/ stiffness, headache, eye pain/ redness, facial/ lip/ eyelid swelling. No medications taken prior to arrival. No alleviating/ aggravating factors. Patient is  concerned about COVID 19 infection at this encounter.  Patient is  immunized for COVID 19.          Objective:   Past Medical History:    ADHD    Migraines    OTHER DISEASES    Pre-eclampsia (HCC)    Pregnancy-induced hypertension (HCC)              Past Surgical History:   Procedure Laterality Date    Impact tooth rem bony w/comp Bilateral 2007    wisdom teeth x 4                Social History     Socioeconomic History    Marital status:      Spouse name: Xavier    Number of children: 0    Years of education: 16   Occupational History    Occupation: Product management   Tobacco Use    Smoking status: Never    Smokeless tobacco: Never   Vaping Use    Vaping status: Never Used   Substance and Sexual Activity    Alcohol use: Yes     Alcohol/week: 2.0 standard drinks of alcohol     Types: 2 Standard drinks or equivalent per week     Comment: occasional    Drug use: No    Sexual activity: Not Currently     Partners: Male   Other Topics Concern     Service No    Blood  Transfusions No    Caffeine Concern Yes     Comment: 1-2 cup coffee daily    Occupational Exposure No    Hobby Hazards No    Sleep Concern No    Back Care No    Exercise No    Bike Helmet No    Self-Exams Yes   Social History Narrative    Lives with               Review of Systems    Positive for stated complaint: Cold - Headache, back pain, congestion, runny nose, diarrhea x Sunday  Other systems are as noted in HPI.  Constitutional and vital signs reviewed.      All other systems reviewed and negative except as noted above.    Physical Exam     ED Triage Vitals [06/04/24 1705]   /87   Pulse 91   Resp 18   Temp 98.5 °F (36.9 °C)   Temp src Temporal   SpO2 98 %   O2 Device None (Room air)       Current Vitals:   Vital Signs  BP: 126/87  Pulse: 91  Resp: 18  Temp: 98.5 °F (36.9 °C)  Temp src: Temporal    Oxygen Therapy  SpO2: 98 %  O2 Device: None (Room air)            Physical Exam  Vitals and nursing note reviewed.   Constitutional:       General: She is not in acute distress.     Appearance: Normal appearance. She is normal weight. She is not ill-appearing, toxic-appearing or diaphoretic.   HENT:      Head: Normocephalic and atraumatic.      Comments: Bilateral frontal maxillary sinus tenderness to palpation and percussion     Right Ear: Tympanic membrane, ear canal and external ear normal.      Left Ear: Tympanic membrane, ear canal and external ear normal.      Nose: Congestion and rhinorrhea present.      Mouth/Throat:      Mouth: Mucous membranes are moist.      Pharynx: Oropharynx is clear. No oropharyngeal exudate or posterior oropharyngeal erythema.   Eyes:      Extraocular Movements: Extraocular movements intact.      Conjunctiva/sclera: Conjunctivae normal.      Pupils: Pupils are equal, round, and reactive to light.   Pulmonary:      Effort: Pulmonary effort is normal. No respiratory distress.      Breath sounds: No stridor. No wheezing, rhonchi or rales.   Chest:      Chest wall: No  tenderness.   Abdominal:      Tenderness: There is no right CVA tenderness or left CVA tenderness.   Musculoskeletal:         General: No swelling, tenderness, deformity or signs of injury. Normal range of motion.      Cervical back: Normal range of motion and neck supple.   Skin:     General: Skin is warm and dry.      Capillary Refill: Capillary refill takes less than 2 seconds.      Coloration: Skin is not jaundiced or pale.      Findings: No bruising, erythema, lesion or rash.   Neurological:      General: No focal deficit present.      Mental Status: She is alert and oriented to person, place, and time. Mental status is at baseline.   Psychiatric:         Mood and Affect: Mood normal.         Behavior: Behavior normal.               ED Course     Labs Reviewed   POCT RAPID STREP - Normal   POCT FLU TEST - Normal    Narrative:     This assay is a rapid molecular in vitro test utilizing nucleic acid amplification of influenza A and B viral RNA.   RAPID SARS-COV-2 BY PCR - Normal   [facility] POCT URINALYSIS DIPSTICK     Results for orders placed or performed during the hospital encounter of 06/04/24   POCT Flu Test    Collection Time: 06/04/24  5:06 PM    Specimen: Nares; Other   Result Value Ref Range    POCT INFLUENZA A Negative Negative    POCT INFLUENZA B Negative Negative   Rapid SARS-CoV-2 by PCR    Collection Time: 06/04/24  5:06 PM    Specimen: Nares; Other   Result Value Ref Range    Rapid SARS-CoV-2 by PCR Not Detected Not Detected   POCT Rapid Strep    Collection Time: 06/04/24  5:16 PM   Result Value Ref Range    POCT Rapid Strep Negative Negative   POCT Urinalysis Dipstick    Collection Time: 06/04/24  5:28 PM   Result Value Ref Range    Urine Color Yellow Yellow    Urine Clarity Clear Clear    Specific Gravity, Urine 1.015 1.005 - 1.030    PH, Urine 6.0 5.0 - 8.0    Protein urine Negative Negative mg/dL    Glucose, Urine Negative Negative mg/dL    Ketone, Urine Negative Negative mg/dL    Bilirubin, Urine  Negative Negative    Blood, Urine Negative Negative    Nitrite Urine Negative Negative    Urobilinogen urine <2.0 <2.0 mg/dL    Leukocyte esterase urine Negative Negative     Vitals:    06/04/24 1705   BP: 126/87   Pulse: 91   Resp: 18   Temp: 98.5 °F (36.9 °C)                      MDM                                        Medical Decision Making  39-year-old well-appearing female presents with acute onset of frontal sinus congestion, rhinorrhea, frontal headache, with unrelated intermittent right sided flank pain is localized in nature.  Rhinorrhea considerations to include but not limited to allergic rhinitis vs bronchitis vs pneumonia vs COVID 19 vs influenza A vs influenza B.  Patient is overall well-appearing, normotensive, nontachycardic, not dyspneic with oxygen saturation at 98% on room air  Flank pain considerations to include pyelonephritis versus nephrolithiasis.  Currently patient denies weakness, saddle anesthesia, bowel/bladder incontinence, hematuria, dysuria, fevers, chills.  Plan  - COVID 19/ strep/ flu swabs  - SpO2 98% on room air  - reassess  - DC to home   - rx: albuterol inhaler 1-2 puffs by mouth every 4-6 hours/ prn.   Flonase 2 sprays to bilateral nostrils BID.  Afrin 2 sprays to bilateral nostrils BID for no more than 48 consecutive hours to avoid rebound congestion.  Sudafed 30mg po q 6 hours.   Tessalon 100mg PO TID.    - refer to PCP for further evaluation    - return to ED      Amount and/or Complexity of Data Reviewed  Labs: ordered. Decision-making details documented in ED Course.     Details: COVID 19/ strep/ flu swabs- negative   POC urine dipstick- negative         Disposition and Plan     Clinical Impression:  1. Congestion of nasal sinus    2. Acute frontal sinusitis, recurrence not specified         Disposition:  Discharge  6/4/2024  5:44 pm    Follow-up:  Arin Santos MD  [address]  [address] [place] [address]  [phone]          [facility]  [place] [address] [facility] [address]  [phone]              Medications Prescribed:  Discharge Medication List as of 6/4/2024  5:44 PM        START taking these medications    Details   benzonatate 100 MG Oral Cap Take 1 capsule (100 mg total) by mouth 3 (three) times daily as needed for cough., Normal, Disp-30 capsule, R-0      fluticasone propionate 50 MCG/ACT Nasal Suspension 2 sprays by Nasal route daily., Normal, Disp-16 g, R-0      oxymetazoline 0.05 % Nasal Solution 1 spray by Nasal route 2 (two) times daily., Normal, Disp-15 mL, R-0      cetirizine-pseudoephedrine ER 5-120 MG Oral Tablet 12 Hr Take 1 tablet by mouth 2 (two) times daily., Normal, Disp-30 tablet, R-0      albuterol 108 (90 Base) MCG/ACT Inhalation Aero Soln Inhale 2 puffs into the lungs every 4 to 6 hours as needed for Wheezing., Normal, Disp-1 each, R-0

## 2024-06-05 NOTE — TELEPHONE ENCOUNTER
PA requested for [facility]  Clinical questions answered and submitted to insurance  Awaiting coverage determination

## 2024-06-05 NOTE — TELEPHONE ENCOUNTER
Response received from [facility]  States PA is not required    No further action necessary  Closing encounter

## 2024-06-28 ENCOUNTER — TELEPHONE (OUTPATIENT)
Dept: SURGERY | Facility: CLINIC | Age: 39
End: 2024-06-28

## 2024-06-28 NOTE — TELEPHONE ENCOUNTER
Patient is calling stating she had an appointment with Dr. Morse today 06/28/24 but appointment got rescheduled for 07/30/24 due to Dr. Morse being pulled into an emergency surgery. Patient is asking is Dr. Elias can referred her to a different neurosurgeon that she can see sooner or if Dr. Elias can talk with Dr. Morse to see if she can get in sooner. Please advice

## 2024-07-01 NOTE — TELEPHONE ENCOUNTER
Spoke to patient who states she is concerned that her appointment with neurosurgery is 7/30 after being bumped due to emergency surgery. Pt is on the wait list.  Patient also concerned that MRI's will cost her up to $1000 out of pocket and wonders if this is necessary or if she should wait until she see's neurosurgery to complete MRI's. Is only a concern due to cost.   Informed that we will speak with provider for advice and get back to her.

## 2024-07-02 NOTE — TELEPHONE ENCOUNTER
Spoke to Anusha, advised she consider seeing Dr. Don as chiari is an area of interest. She will delay MRI spines until she sees him and has discussion re benefits of getting in her clinical scenario which I support.   Elizabeth Elias, DO

## 2024-07-09 ENCOUNTER — PATIENT MESSAGE (OUTPATIENT)
Dept: NEUROLOGY | Facility: CLINIC | Age: 39
End: 2024-07-09

## 2024-07-10 NOTE — TELEPHONE ENCOUNTER
From: Anusha Cho  To: Elizabeth Elias  Sent: 7/9/2024 7:44 PM CDT  Subject: Medication question     Hi Dr. Elias,    My headaches and migraines have increased in frequency.     One of the migraines I had on the 4th was brutal. It started right when I woke up with no aura warning. Additionally the nausea was so bad that roughly 5-10 min after taking the NurtecODT, I vomitted. As it says per 24hrs, I didn’t retake. The migraine was so bad I couldn’t get out of bed until nearly 630pm. At that point I forced myself up and while migraine reduced to a headache, it was still awful.    Since seeing you, I’ve had mild headaches every couple days. About 4 migraines, and a handful of headaches that weren’t quite migraines, but pretty painful.    Medication questions:  - if I vomit, can i retake?  - if I have a headache can I take it? What if I stop getting the aura warning?   -if I have an important day (holiday, wedding, kids first day of school, [place] trip), can I take the meds proactively to prevent a migraine/headache?     Migraine questions:  -why might my migraines/headaches be increasing in frequency? Should I be concerned?  - why am I not getting the aura warning anymore?  -previously I only got migraines. headaches were super rare. Now much more common. Why might this be and should I be concerned?    When should I plan to have a follow up appointment with you?    Thank you,  Anusha Lao

## 2024-07-10 NOTE — TELEPHONE ENCOUNTER
Spoke to patient to inform of trying alternate OTC medication if she is vomiting up her medication.  Also informed to look up PROPRANOLOL and will likely be discussed at next OV.  Verbalized understanding.    Routed to  to schedule.

## 2024-07-12 ENCOUNTER — OFFICE VISIT (OUTPATIENT)
Dept: SURGERY | Facility: CLINIC | Age: 39
End: 2024-07-12
Payer: COMMERCIAL

## 2024-07-12 VITALS
HEIGHT: 67 IN | HEART RATE: 78 BPM | DIASTOLIC BLOOD PRESSURE: 66 MMHG | BODY MASS INDEX: 25.9 KG/M2 | WEIGHT: 165 LBS | SYSTOLIC BLOOD PRESSURE: 130 MMHG

## 2024-07-12 DIAGNOSIS — G93.5 CHIARI I MALFORMATION (HCC): ICD-10-CM

## 2024-07-12 DIAGNOSIS — R26.89 BALANCE DISORDER: Primary | ICD-10-CM

## 2024-07-12 PROCEDURE — 99204 OFFICE O/P NEW MOD 45 MIN: CPT | Performed by: NEUROLOGICAL SURGERY

## 2024-07-12 RX ORDER — DEXTROAMPHETAMINE SACCHARATE, AMPHETAMINE ASPARTATE MONOHYDRATE, DEXTROAMPHETAMINE SULFATE AND AMPHETAMINE SULFATE 5; 5; 5; 5 MG/1; MG/1; MG/1; MG/1
20 CAPSULE, EXTENDED RELEASE ORAL DAILY
COMMUNITY
Start: 2024-06-18

## 2024-07-12 NOTE — PATIENT INSTRUCTIONS
Refill policies:    Allow 2-3 business days for refills; controlled substances may take longer.  Contact your pharmacy at least 5 days prior to running out of medication and have them send an electronic request or submit request through the “request refill” option in your [employer] account.  Refills are not addressed on weekends; covering physicians do not authorize routine medications on weekends.  No narcotics or controlled substances are refilled after noon on Fridays or by on call physicians.  By law, narcotics must be electronically prescribed.  A 30 day supply with no refills is the maximum allowed.  If your prescription is due for a refill, you may be due for a follow up appointment.  To best provide you care, patients receiving routine medications need to be seen at least once a year.  Patients receiving narcotic/controlled substance medications need to be seen at least once every 3 months.  In the event that your preferred pharmacy does not have the requested medication in stock (e.g. Backordered), it is your responsibility to find another pharmacy that has the requested medication available.  We will gladly send a new prescription to that pharmacy at your request.    Scheduling Tests:    If your physician has ordered radiology tests such as MRI or CT scans, please contact Central Scheduling at [phone] right away to schedule the test.  Once scheduled, the [facility] Centralized Referral Team will work with your insurance carrier to obtain pre-certification or prior authorization.  Depending on your insurance carrier, approval may take 3-10 days.  It is highly recommended patients assure they have received an authorization before having a test performed.  If test is done without insurance authorization, patient may be responsible for the entire amount billed.      Precertification and Prior Authorizations:  If your physician has recommended that you have a procedure or additional testing performed the [facility]  Centralized Referral Team will contact your insurance carrier to obtain pre-certification or prior authorization.    You are strongly encouraged to contact your insurance carrier to verify that your procedure/test has been approved and is a COVERED benefit.  Although the [facility] Centralized Referral Team does its due diligence, the insurance carrier gives the disclaimer that \"Although the procedure is authorized, this does not guarantee payment.\"    Ultimately the patient is responsible for payment.   Thank you for your understanding in this matter.  Paperwork Completion:  If you require FMLA or disability paperwork for your recovery, please make sure to either drop it off or have it faxed to our office at [phone]. Be sure the form has your name and date of birth on it.  The form will be faxed to our Forms Department and they will complete it for you.  There is a 25$ fee for all forms that need to be filled out.  Please be aware there is a 10-14 day turnaround time.  You will need to sign a release of information (SATNAM) form if your paperwork does not come with one.  You may call the Forms Department with any questions at [phone].  Their fax number is [phone].

## 2024-07-12 NOTE — H&P
[facility]  Neurological Surgery Clinic Note    Anusha Cho  2/14/1985  [id number]  PCP: Arin Santos MD    REASON FOR VISIT:  Chiari malformation    HISTORY OF PRESENT ILLNESS:  Anusha Cho is a 39 year old female who presents to clinic for evaluation of Chiari malformation found during headache workup.  She reports a longstanding history of migraines which have been progressively worsening.  And she notes that over the past few months she has been having a different type of moderate to mild headache that is frontotemporal and/or occipital, which is not tussive or exacerbated with Valsalva.  She reports that prior to the onset of these new headaches she was having vertiginous symptoms back in May that worsened when she was laying down but these resolved.  She had an episode in May that led to her CTA when she had tingling down her arms and legs with associated word finding difficulty particularly when texting.  Besides this episode she does not report consistent balance difficulty or fine motor dysfunction. MRI brain 5/28/2024 demonstrates 6.5 mm of tonsillar descent with mild crowding of the foramen magnum consistent with a Chiari malformation.  CTA head and neck 5/16/2024 is unremarkable.    PAST MEDICAL HISTORY:  Past Medical History:    ADHD    Migraines    OTHER DISEASES    Pre-eclampsia (HCC)    Pregnancy-induced hypertension (HCC)       PAST SURGICAL HISTORY:  Past Surgical History:   Procedure Laterality Date    Impact tooth rem bony w/comp Bilateral 2007    wisdom teeth x 4       FAMILY HISTORY:  family history includes Allergies in her sister; Arthritis in her maternal aunt, maternal cousin female, maternal grandmother, and mother; Cancer in her paternal grandfather; Dementia in her maternal grandmother; Diabetes in her maternal aunt and maternal grandmother; Heart Disease in her maternal grandfather; High Cholesterol in her maternal aunt, maternal  grandmother, and mother; Hypertension in her maternal aunt, maternal grandmother, and mother; Prostate Cancer in her maternal aunt.    SOCIAL HISTORY:   reports that she has never smoked. She has never used smokeless tobacco. She reports current alcohol use of about 2.0 standard drinks of alcohol per week. She reports that she does not use drugs.    ALLERGIES:  Allergies   Allergen Reactions    Pcns [Penicillins] HIVES    Triptans OTHER (SEE COMMENTS)     History of complex migraine (right sided sensory change, language change)- relative contraindication        MEDICATIONS:  Current Outpatient Medications on File Prior to Visit   Medication Sig Dispense Refill    Amphetamine-Dextroamphet ER 20 MG Oral Capsule SR 24 Hr Take 1 capsule (20 mg total) by mouth daily.      Rimegepant Sulfate (NURTEC) 75 MG Oral Tablet Dispersible Take 75 mg by mouth as needed. Take one tablet at onset of migraine.  Maximum dose in 24 hours is 1 tablet (75mg). 8 tablet 11    fluticasone propionate 50 MCG/ACT Nasal Suspension 2 sprays by Nasal route daily. 16 g 0    cetirizine-pseudoephedrine ER 5-120 MG Oral Tablet 12 Hr Take 1 tablet by mouth 2 (two) times daily. 30 tablet 0    ondansetron (ZOFRAN) 4 mg tablet Take 1 tablet (4 mg total) by mouth every 8 (eight) hours as needed for Nausea. 30 tablet 0    LORazepam 0.5 MG Oral Tab Take 1 tablet (0.5 mg total) by mouth daily as needed.      Multiple Vitamin Oral Tab Take 1 tablet by mouth daily.       No current facility-administered medications on file prior to visit.       REVIEW OF SYSTEMS:  A 10-point system was reviewed.  Pertinent positives and negatives are noted in HPI.      PHYSICAL EXAMINATION:  VITAL SIGNS: /66   Pulse 78   Ht 67\"   Wt 165 lb (74.8 kg)   LMP 05/29/2024 (Exact Date)   BMI 25.84 kg/m²     A&Ox3, no acute distress  PERRL, EOMi, FS, TM  Full strength x 4, no drift  Sensation intact   3+ reflexes in the bilateral patellars with positive suprapatellars and  crossed adductors  Otherwise normoreflexic  No Avi's or clonus  Stable casual gait    ASSESSMENT:  39-year-old female with an incidental Chiari malformation    I spoke with the patient about the diagnosis of a Chiari malformation and we reviewed the imaging together.  We discussed the natural history and the signs and symptoms for which to seek medical attention.  We discussed the need for further imaging given the mild increased reflexes in her lower extremities to rule out syringomyelia in the setting of a Chiari malformation.  We discussed that there is no indication for surgical intervention at this time.  She expressed understanding and agreement with the plan    Plan:  MRI cervical and thoracic without contrast ordered  Will follow-up when imaging is completed    Chirag Morse MD  Neurological Surgery  [facility] Time: 45 min including face to face time, chart review, imaging interpretation, and coordination of care

## 2024-07-23 ENCOUNTER — OFFICE VISIT (OUTPATIENT)
Dept: NEUROLOGY | Facility: CLINIC | Age: 39
End: 2024-07-23
Payer: COMMERCIAL

## 2024-07-23 VITALS
BODY MASS INDEX: 26.68 KG/M2 | WEIGHT: 170 LBS | HEIGHT: 67 IN | HEART RATE: 108 BPM | DIASTOLIC BLOOD PRESSURE: 44 MMHG | RESPIRATION RATE: 18 BRPM | OXYGEN SATURATION: 100 % | SYSTOLIC BLOOD PRESSURE: 114 MMHG

## 2024-07-23 DIAGNOSIS — G43.109 MIGRAINE WITH AURA AND WITHOUT STATUS MIGRAINOSUS, NOT INTRACTABLE: ICD-10-CM

## 2024-07-23 PROCEDURE — 99213 OFFICE O/P EST LOW 20 MIN: CPT | Performed by: STUDENT IN AN ORGANIZED HEALTH CARE EDUCATION/TRAINING PROGRAM

## 2024-07-23 RX ORDER — UBROGEPANT 50 MG/1
TABLET ORAL
Qty: 4 TABLET | Refills: 0 | COMMUNITY
Start: 2024-07-23 | End: 2025-07-23

## 2024-07-23 RX ORDER — ONDANSETRON 4 MG/1
4 TABLET, FILM COATED ORAL EVERY 8 HOURS PRN
Qty: 30 TABLET | Refills: 5 | Status: SHIPPED | OUTPATIENT
Start: 2024-07-23

## 2024-07-23 NOTE — PROGRESS NOTES
Neurology Office Visit    Anusha Cho   Date of Birth 2/14/1985    Interval history July 23, 2024:   Per Anusha, migraine management depends on day. Has had a couple migraines since early 6/2024. Nurtec worked well, overall very effective. On 7/4, awoke with bad headache, then vomited. On 7/13 headache, took tylenol. On 7/15 mild in afternoon, progressively worsened, took zofran because a little nauseous.     Migraine history:   -Onset: 2017, Saw Dr. Perez 17, improved with pregnancy. Recurred 2021 after second kid  -Frequency: once per month, disabling. Previously once every three months. Also notes many regular headache every 2-3 days, tension type tight band around head. Had period of positional dizziness, improved with sitting down, prior to increase in frequency. Some vertigo even with lying down. Worsening two bad days, 3 moderate days in July.   -Timing: more in the afternoon, rare in morning. Snores at night when allergies flaring.   -Character: migraines- throbbing. Regular headache- tight band.   -Triggers: computers, dehydration. Spontanoeus at times. No clear positional trigger.   -Associated symptoms:  photophobia,  phonophobia, yes vision aura, no positional, nausea, vomiting, generalized weakness, history of rare right numbness/tingling (right arm and leg), coordination. No change with valsalva.   -Headache for which she went to ED: Has had some tingling down right arm and leg, some speech issues (took 30 minutes to write a text, wrods were flipped in weird order, punctuation was off. Spouse told her she was making sense but was very delayed.   -Current meds:   -Abortive: excedrin migraine- helps some, can dull migraines but not all, zofran  -Preventive: none  -Past meds:   -Abortive: ibuprofen, doesn't help. Tylenol, doesn't help.   -Preventive: topamax- very emotional.   -Non pharm lying down  -Disabling yes, has been to ED. Last for hours     Per ED chart review, treated with migraine  cocktail. CTA head and neck unrevealing. Advised outpatient follow up.     Problem List:  Patient Active Problem List   Diagnosis    Migraine with aura and without status migrainosus, not intractable    Acute midline low back pain without sciatica    Upper back pain on right side    Acute maxillary sinusitis    Raynaud's phenomenon without gangrene    Venous insufficiency of both lower extremities       PMHx:  Past Medical History:    ADHD    Migraines    OTHER DISEASES    Pre-eclampsia (HCC)    Pregnancy-induced hypertension (HCC)       PSHx:  Past Surgical History:   Procedure Laterality Date    Impact tooth rem bony w/comp Bilateral 2007    wisdom teeth x 4       SocHx:  Social History     Socioeconomic History    Marital status:      Spouse name: Xavier    Number of children: 0    Years of education: 16   Occupational History    Occupation: Product management   Tobacco Use    Smoking status: Never    Smokeless tobacco: Never   Vaping Use    Vaping status: Never Used   Substance and Sexual Activity    Alcohol use: Yes     Alcohol/week: 2.0 standard drinks of alcohol     Types: 2 Standard drinks or equivalent per week     Comment: occasional    Drug use: No    Sexual activity: Not Currently     Partners: Male   Other Topics Concern     Service No    Blood Transfusions No    Caffeine Concern Yes     Comment: 1-2 cup coffee daily    Occupational Exposure No    Hobby Hazards No    Sleep Concern No    Back Care No    Exercise No    Bike Helmet No    Self-Exams Yes   Social History Narrative    Lives with        Family History:  Family History   Problem Relation Age of Onset    Hypertension Mother     High Cholesterol Mother     Arthritis Mother         RA    Hypertension Maternal Grandmother     High Cholesterol Maternal Grandmother     Diabetes Maternal Grandmother     Dementia Maternal Grandmother     Arthritis Maternal Grandmother         RA    Allergies Sister     Heart Disease Maternal  Grandfather     Cancer Paternal Grandfather         Pancreatic    Hypertension Maternal Aunt     High Cholesterol Maternal Aunt     Diabetes Maternal Aunt     Other (Prostate Cancer) Maternal Aunt         Pat uncle    Arthritis Maternal Aunt         RA    Arthritis Maternal Cousin Female         RA       Current Medications:  Current Outpatient Medications   Medication Sig Dispense Refill    ondansetron (ZOFRAN) 4 mg tablet Take 1 tablet (4 mg total) by mouth every 8 (eight) hours as needed for Nausea. 30 tablet 5    Amphetamine-Dextroamphet ER 20 MG Oral Capsule SR 24 Hr Take 1 capsule (20 mg total) by mouth daily.      Rimegepant Sulfate (NURTEC) 75 MG Oral Tablet Dispersible Take 75 mg by mouth as needed. Take one tablet at onset of migraine.  Maximum dose in 24 hours is 1 tablet (75mg). 8 tablet 11    fluticasone propionate 50 MCG/ACT Nasal Suspension 2 sprays by Nasal route daily. 16 g 0    cetirizine-pseudoephedrine ER 5-120 MG Oral Tablet 12 Hr Take 1 tablet by mouth 2 (two) times daily. 30 tablet 0    LORazepam 0.5 MG Oral Tab Take 1 tablet (0.5 mg total) by mouth daily as needed.      Multiple Vitamin Oral Tab Take 1 tablet by mouth daily.       Objective/Physical Exam:  Vital Signs:  Blood pressure 114/44, pulse 108, resp. rate 18, height 67\", weight 170 lb (77.1 kg), last menstrual period 05/29/2024, SpO2 100%, not currently breastfeeding.  General: Alert, good recall of personal medical history    Respiratory: Non labored breathing on room air    Cardiovascular: Regular rate    Mental status: Alert, oriented, language fluent, comprehension intact    Cranial nerves: Optic disc margins sharp VF full to confrontation bilaterally. Pupils equal, round, equally reactive to light and accommodation. Extraocular eye movements intact without nystagmus. Face sensation intact to light touch. Face strength symmetric and intact. No dysarthria.    Motor:   Power:   -Right upper extremity: shoulder abductors 5, elbow  extensors 5, elbow flexors 5, wrist extensors 5, finger extension 5  -Left upper extremity: shoulder abductors 5, elbow extensors 5, elbow flexors 5, wrist extensors 5, finger extension 5  -Right lower extremity: hip flexion 5, knee extension 5, dorsiflexion 5  -Left lower extremity: hip flexion 5, knee extension 5, dorsiflexion 5  Tone: Normal   Bulk: Normal  Abnormal movements: None    Sensory: Intact to light touch in distal extremities.    Coordination: Finger to nose and heel to shin intact.    Reflexes: Right/Left: Biceps 2/2, triceps 2/2, brachioradialis 2/2, hoffmans negative. Patella 2/2, achilles 2/2, plantars downgoing.    Gait: Narrow based, symmetric arm swing, no gait assist.      Labs:     Component      Latest Ref Rng 5/16/2024   WBC      4.0 - 11.0 x10(3) uL 9.4    RBC      3.80 - 5.30 x10(6)uL 4.60    Hemoglobin      12.0 - 16.0 g/dL 14.1    Hematocrit      35.0 - 48.0 % 41.5    Platelet Count      150.0 - 450.0 10(3)uL 300.0    MCV      80.0 - 100.0 fL 90.2    MCH      26.0 - 34.0 pg 30.7    MCHC      31.0 - 37.0 g/dL 34.0    RDW      % 11.8    Prelim Neutrophil Abs      1.50 - 7.70 x10 (3) uL 7.60    Neutrophils Absolute      1.50 - 7.70 x10(3) uL 7.60    Lymphocytes Absolute      1.00 - 4.00 x10(3) uL 1.32    Monocytes Absolute      0.10 - 1.00 x10(3) uL 0.38    Eosinophils Absolute      0.00 - 0.70 x10(3) uL 0.01    Basophils Absolute      0.00 - 0.20 x10(3) uL 0.06    Immature Granulocyte Absolute      0.00 - 1.00 x10(3) uL 0.02    Neutrophils %      % 81.0    Lymphocytes %      % 14.1    Monocytes %      % 4.0    Eosinophils %      % 0.1    Basophils %      % 0.6    Immature Granulocyte %      % 0.2    Glucose      70 - 99 mg/dL 90    Sodium      136 - 145 mmol/L 138    Potassium      3.5 - 5.1 mmol/L 3.8    Chloride      98 - 112 mmol/L 106    Carbon Dioxide, Total      21.0 - 32.0 mmol/L 26.0    ANION GAP      0 - 18 mmol/L 6    BUN      9 - 23 mg/dL 12    CREATININE      0.55 - 1.02 mg/dL  0.87    CALCIUM      8.5 - 10.1 mg/dL 9.1    CALCULATED OSMOLALITY      275 - 295 mOsm/kg 285    EGFR      >=60 mL/min/1.73m2 87    AST (SGOT)      15 - 37 U/L 25    ALT (SGPT)      13 - 56 U/L 28    ALKALINE PHOSPHATASE      37 - 98 U/L 62    Total Bilirubin      0.1 - 2.0 mg/dL 0.6    PROTEIN, TOTAL      6.4 - 8.2 g/dL 7.8    Albumin      3.4 - 5.0 g/dL 4.2    Globulin      2.8 - 4.4 g/dL 3.6    A/G Ratio      1.0 - 2.0  1.2      Imaging:  MRI BRAIN (CPT=70551)    Result Date: 5/28/2024  CONCLUSION:  1. Chiari I malformation with low-lying cerebellar tonsil 6.2 mm below the foramen magnum.  2. Otherwise normal MRI of the brain.  No acute intracranial abnormalities identified. 3. Mild maxillary and ethmoid sinus mucosal changes    Dictated by (CST): Luther Sharma MD on 5/28/2024 at 9:44 AM     Finalized by (CST): Luther Sharma MD on 5/28/2024 at 9:53 AM          CTA BRAIN + CTA CAROTIDS (CPT=70496/97351)    Result Date: 5/16/2024  CONCLUSION:  Unremarkable CT angiogram head and neck examination.   LOCATION:  [place] [id number]   Dictated by (CST): Darwin Espana MD on 5/16/2024 at 8:23 PM     Finalized by (CST): Darwin Espana MD on 5/16/2024 at 8:28 PM          Assessment:  Anusha Cho is a(n) 39 year old female who is referred for headaches. Headaches semiology consistent with migraine with aura. One episode with paresthesias right arm and leg as well as some trouble with reading and delayed speech, concerning for complex migraine. No change in headache with valsalva. Reports period of prolonged vertigo in past. MR brain with chiari 1 low lying cerebellar tonsils. CTA head and neck unrevealing. Will treat symptomatically for migraine with aura as below; will trial Ubrelvy sample given vomited nurtec shortly after taking (so could take second dose if needed). Seeing Dr. Morse neurosurgery for chiari felt likely incidental. No prescription abortive for now given frequency.     Plan:  -Migraine:     -Abortive: Nurtec or Ubrelvy (Ubrelvy 50mg may repeat dose after 2 hours if migraine still present. If you vomit med within 30 minutes of taking, may repeat dose then. If Ubrelvy works, send me a note and we will submit prior auth), caffeine, ondansetron, hydrate well. May consider low dose diphenydramine (sparingly). Can read tizanidine (muscle relaxer- can cause dizziness/drop BP a bit when you stand up), if you are interested let me know.    -Preventive: Trial riboflavin (vitamin B2) 400mg by mouth once daily and magnesium oxide 600mg by mouth once daily, CoQ10.     -Read about amitripyline and Aimovig, may consider in future  -Dr. Morse, neurosurgery for chiari  -If getting more morning headaches, may consider sleep apnea testing    1. Migraine with aura and without status migrainosus, not intractable  - ondansetron (ZOFRAN) 4 mg tablet; Take 1 tablet (4 mg total) by mouth every 8 (eight) hours as needed for Nausea.  Dispense: 30 tablet; Refill: 5      Total time 28 minutes including chart review, eliciting history, physical exam, and counseling.     Elizabeth Elias, DO

## 2024-07-23 NOTE — PATIENT INSTRUCTIONS
-Migraine:    -Abortive: Nurtec or Ubrelvy (Ubrelvy 50mg may repeat dose after 2 hours if migraine still present. If you vomit med within 30 minutes of taking, may repeat dose then. If Ubrelvy works, send me a note and we will submit prior auth), caffeine, ondansetron, hydrate well. May consider low dose diphenydramine (sparingly). Can read tizanidine (muscle relaxer- can cause dizziness/drop BP a bit when you stand up), if you are interested let me know.    -Preventive: Trial riboflavin (vitamin B2) 400mg by mouth once daily and magnesium oxide 600mg by mouth once daily, CoQ10.     -Read about amitripyline and Aimovig, may consider in future  -Dr. Morse, neurosurgery for chiari  -If getting more morning headaches, may consider sleep apnea testing      Refill policies:    Allow 2-3 business days for refills; controlled substances may take longer.  Contact your pharmacy at least 5 days prior to running out of medication and have them send an electronic request or submit request through the “request refill” option in your [employer] account.  Refills are not addressed on weekends; covering physicians do not authorize routine medications on weekends.  No narcotics or controlled substances are refilled after noon on Fridays or by on call physicians.  By law, narcotics must be electronically prescribed.  A 30 day supply with no refills is the maximum allowed.  If your prescription is due for a refill, you may be due for a follow up appointment.  To best provide you care, patients receiving routine medications need to be seen at least once a year.  Patients receiving narcotic/controlled substance medications need to be seen at least once every 3 months.  In the event that your preferred pharmacy does not have the requested medication in stock (e.g. Backordered), it is your responsibility to find another pharmacy that has the requested medication available.  We will gladly send a new prescription to that pharmacy at your  request.    Scheduling Tests:    If your physician has ordered radiology tests such as MRI or CT scans, please contact Central Scheduling at [phone] right away to schedule the test.  Once scheduled, the [facility] Centralized Referral Team will work with your insurance carrier to obtain pre-certification or prior authorization.  Depending on your insurance carrier, approval may take 3-10 days.  It is highly recommended patients assure they have received an authorization before having a test performed.  If test is done without insurance authorization, patient may be responsible for the entire amount billed.      Precertification and Prior Authorizations:  If your physician has recommended that you have a procedure or additional testing performed the [facility] Centralized Referral Team will contact your insurance carrier to obtain pre-certification or prior authorization.    You are strongly encouraged to contact your insurance carrier to verify that your procedure/test has been approved and is a COVERED benefit.  Although the [facility] Centralized Referral Team does its due diligence, the insurance carrier gives the disclaimer that \"Although the procedure is authorized, this does not guarantee payment.\"    Ultimately the patient is responsible for payment.   Thank you for your understanding in this matter.  Paperwork Completion:  If you require FMLA or disability paperwork for your recovery, please make sure to either drop it off or have it faxed to our office at [phone]. Be sure the form has your name and date of birth on it.  The form will be faxed to our Forms Department and they will complete it for you.  There is a 25$ fee for all forms that need to be filled out.  Please be aware there is a 10-14 day turnaround time.  You will need to sign a release of information (SATNAM) form if your paperwork does not come with one.  You may call the Forms Department with any questions at [phone].  Their fax number is [phone].

## 2024-07-24 ENCOUNTER — TELEPHONE (OUTPATIENT)
Dept: NEUROLOGY | Facility: CLINIC | Age: 39
End: 2024-07-24

## 2024-07-24 NOTE — TELEPHONE ENCOUNTER
PA requested for [facility]  Clinical questions answered and submitted to insurance  Awaiting coverage determination

## 2024-07-31 NOTE — TELEPHONE ENCOUNTER
Response received from [facility] Prior Authorization not required for patient/medication     Closing encounter

## 2024-08-13 ENCOUNTER — HOSPITAL ENCOUNTER (OUTPATIENT)
Dept: MRI IMAGING | Age: 39
Discharge: HOME | End: 2024-08-13
Attending: NEUROLOGICAL SURGERY
Payer: COMMERCIAL

## 2024-08-13 DIAGNOSIS — G93.5 CHIARI I MALFORMATION (HCC): ICD-10-CM

## 2024-08-13 DIAGNOSIS — R26.89 BALANCE DISORDER: ICD-10-CM

## 2024-08-13 PROCEDURE — 72146 MRI CHEST SPINE W/O DYE: CPT | Performed by: NEUROLOGICAL SURGERY

## 2024-08-13 PROCEDURE — 72141 MRI NECK SPINE W/O DYE: CPT | Performed by: NEUROLOGICAL SURGERY

## 2024-08-15 ENCOUNTER — OFFICE VISIT (OUTPATIENT)
Dept: SURGERY | Facility: CLINIC | Age: 39
End: 2024-08-15
Payer: COMMERCIAL

## 2024-08-15 VITALS
WEIGHT: 170 LBS | DIASTOLIC BLOOD PRESSURE: 80 MMHG | HEIGHT: 67 IN | SYSTOLIC BLOOD PRESSURE: 112 MMHG | HEART RATE: 100 BPM | BODY MASS INDEX: 26.68 KG/M2

## 2024-08-15 DIAGNOSIS — G93.5 CHIARI I MALFORMATION (HCC): Primary | ICD-10-CM

## 2024-08-15 PROCEDURE — 99213 OFFICE O/P EST LOW 20 MIN: CPT | Performed by: NEUROLOGICAL SURGERY

## 2024-08-17 NOTE — PROGRESS NOTES
[facility]  Neurological Surgery Clinic Note    Anusha Cho  2/14/1985  [id number]  PCP: Arin Santos MD    REASON FOR VISIT:  Chiari malformation     HISTORY OF PRESENT ILLNESS:  Anusha Cho is a 39 year old female who presented initially to clinic on 7/12/24 for evaluation of Chiari malformation found during headache workup.  She reports a longstanding history of migraines which have been progressively worsening.  And she notes that over the past few months she has been having a different type of moderate to mild headache that is frontotemporal and/or occipital, which is not tussive or exacerbated with Valsalva.  She reports that prior to the onset of these new headaches she was having vertiginous symptoms back in May that worsened when she was laying down but these resolved.  She had an episode in May that led to her CTA when she had tingling down her arms and legs with associated word finding difficulty particularly when texting.  Besides this episode she does not report consistent balance difficulty or fine motor dysfunction. MRI brain 5/28/2024 demonstrates 6.5 mm of tonsillar descent with mild crowding of the foramen magnum consistent with a Chiari malformation.  CTA head and neck 5/16/2024 is unremarkable.    Interval History 8/15/24  She reports no new symptoms. Reports Nurtec is helping with headache intensity, but not frequency. MRI cervical and thoracic 8/13/24 demonstrates prominence of the central canal in the thoracic spine.     PAST MEDICAL HISTORY:  Past Medical History:    ADHD    Migraines    OTHER DISEASES    Pre-eclampsia (HCC)    Pregnancy-induced hypertension (HCC)       PAST SURGICAL HISTORY:  Past Surgical History:   Procedure Laterality Date    Impact tooth rem bony w/comp Bilateral 2007    wisdom teeth x 4       FAMILY HISTORY:  family history includes Allergies in her sister; Arthritis in her maternal aunt, maternal cousin female,  maternal grandmother, and mother; Cancer in her paternal grandfather; Dementia in her maternal grandmother; Diabetes in her maternal aunt and maternal grandmother; Heart Disease in her maternal grandfather; High Cholesterol in her maternal aunt, maternal grandmother, and mother; Hypertension in her maternal aunt, maternal grandmother, and mother; Prostate Cancer in her maternal aunt.    SOCIAL HISTORY:   reports that she has never smoked. She has never used smokeless tobacco. She reports current alcohol use of about 2.0 standard drinks of alcohol per week. She reports that she does not use drugs.    ALLERGIES:  Allergies   Allergen Reactions    Pcns [Penicillins] HIVES    Triptans OTHER (SEE COMMENTS)     History of complex migraine (right sided sensory change, language change)- relative contraindication        MEDICATIONS:  Current Outpatient Medications on File Prior to Visit   Medication Sig Dispense Refill    ondansetron (ZOFRAN) 4 mg tablet Take 1 tablet (4 mg total) by mouth every 8 (eight) hours as needed for Nausea. 30 tablet 5    Amphetamine-Dextroamphet ER 20 MG Oral Capsule SR 24 Hr Take 1 capsule (20 mg total) by mouth daily.      Rimegepant Sulfate (NURTEC) 75 MG Oral Tablet Dispersible Take 75 mg by mouth as needed. Take one tablet at onset of migraine.  Maximum dose in 24 hours is 1 tablet (75mg). 8 tablet 11    fluticasone propionate 50 MCG/ACT Nasal Suspension 2 sprays by Nasal route daily. 16 g 0    cetirizine-pseudoephedrine ER 5-120 MG Oral Tablet 12 Hr Take 1 tablet by mouth 2 (two) times daily. 30 tablet 0    LORazepam 0.5 MG Oral Tab Take 1 tablet (0.5 mg total) by mouth daily as needed.      Multiple Vitamin Oral Tab Take 1 tablet by mouth daily.       No current facility-administered medications on file prior to visit.       REVIEW OF SYSTEMS:  A 10-point system was reviewed.  Pertinent positives and negatives are noted in HPI.      PHYSICAL EXAMINATION:  VITAL SIGNS: /80 (BP Location:  Left arm, Patient Position: Sitting, Cuff Size: large)   Pulse 100   Ht 67\"   Wt 170 lb (77.1 kg)   LMP 05/29/2024 (Exact Date)   BMI 26.63 kg/m²     A&Ox3, no acute distress  PERRL, EOMi, FS, TM  Full strength x 4, no drift  Sensation intact   3+ reflexes in the bilateral patellars with positive suprapatellars and crossed adductors  Otherwise normoreflexic  No Avi's or clonus  Stable casual gait    ASSESSMENT:  39-year-old female with an incidental Chiari malformation     I spoke with the patient about the diagnosis of a Chiari malformation and we reviewed the imaging together.  We discussed the natural history and the signs and symptoms for which to seek medical attention.  I discussed the case with Dr. Elias, the patient's neurologist, and we agree there is no indication for surgery at this time which I explained to the patient.  We will continue with serial imaging and close clinical monitoring.  She expressed understanding and agreement with the plan    Plan:  Follow-up in 6 months with repeat MRI brain, cervical spine, thoracic spine    Chirag Morse MD  Neurological Surgery  [facility] Time: 20 min including face to face time, chart review, imaging interpretation, and coordination of care

## 2024-09-27 LAB
AMB EXT BILIRUBIN, TOTAL: 0.2 MG/DL
AMB EXT CALCIUM: 9.3
AMB EXT CHOL/HDL RATIO: 1.9
AMB EXT CHOLESTEROL, TOTAL: 177 MG/DL
AMB EXT CMP ALT: 15 U/L
AMB EXT CMP AST: 16 U/L
AMB EXT CREATININE: 0.85 MG/DL
AMB EXT EGFR NON-AA: 90
AMB EXT GLUCOSE: 88 MG/DL
AMB EXT HDL CHOLESTEROL: 93 MG/DL
AMB EXT HGBA1C: 5.1 %
AMB EXT LDL CHOLESTEROL, DIRECT: 71 MG/DL
AMB EXT TOTAL PROTEIN: 7
AMB EXT TRIGLYCERIDES: 44 MG/DL
AMB EXT TSH: 1.91 MIU/ML

## 2024-10-17 ENCOUNTER — TELEPHONE (OUTPATIENT)
Dept: INTERNAL MEDICINE CLINIC | Facility: CLINIC | Age: 39
End: 2024-10-17

## 2024-10-17 ENCOUNTER — OFFICE VISIT (OUTPATIENT)
Dept: INTERNAL MEDICINE CLINIC | Facility: CLINIC | Age: 39
End: 2024-10-17
Payer: COMMERCIAL

## 2024-10-17 VITALS
BODY MASS INDEX: 26.21 KG/M2 | SYSTOLIC BLOOD PRESSURE: 100 MMHG | OXYGEN SATURATION: 99 % | TEMPERATURE: 98 F | HEART RATE: 100 BPM | HEIGHT: 67 IN | WEIGHT: 167 LBS | DIASTOLIC BLOOD PRESSURE: 65 MMHG

## 2024-10-17 DIAGNOSIS — G43.109 MIGRAINE WITH AURA AND WITHOUT STATUS MIGRAINOSUS, NOT INTRACTABLE: Primary | ICD-10-CM

## 2024-10-17 DIAGNOSIS — G93.5 CHIARI MALFORMATION TYPE I (HCC): ICD-10-CM

## 2024-10-17 PROCEDURE — 99213 OFFICE O/P EST LOW 20 MIN: CPT | Performed by: PHYSICIAN ASSISTANT

## 2024-10-17 NOTE — PROGRESS NOTES
Anusha Cho is a 39 year old female.   Chief Complaint   Patient presents with    Follow - Up     Rm 15 SS      HPI:    Pt presents with the following:    Discuss labs. She completed labs through wellness screening at her 's work.     Chiari malformation. Now seeing Dr Morse. No need for surgery at this time. Currently just monitoring and will have repeat MRI in 6 months.     Migraines. Seeing Dr Elias and migraines are now better controlled.       Allergies:  Allergies[1]   Current Meds:  Current Outpatient Medications   Medication Sig Dispense Refill    ondansetron (ZOFRAN) 4 mg tablet Take 1 tablet (4 mg total) by mouth every 8 (eight) hours as needed for Nausea. 30 tablet 5    Amphetamine-Dextroamphet ER 20 MG Oral Capsule SR 24 Hr Take 1 capsule (20 mg total) by mouth daily.      Rimegepant Sulfate (NURTEC) 75 MG Oral Tablet Dispersible Take 75 mg by mouth as needed. Take one tablet at onset of migraine.  Maximum dose in 24 hours is 1 tablet (75mg). 8 tablet 11    fluticasone propionate 50 MCG/ACT Nasal Suspension 2 sprays by Nasal route daily. 16 g 0    LORazepam 0.5 MG Oral Tab Take 1 tablet (0.5 mg total) by mouth daily as needed.      Multiple Vitamin Oral Tab Take 1 tablet by mouth daily.      cetirizine-pseudoephedrine ER 5-120 MG Oral Tablet 12 Hr Take 1 tablet by mouth 2 (two) times daily. (Patient not taking: Reported on 10/17/2024) 30 tablet 0        PMH:     Past Medical History:    ADHD    Migraines    OTHER DISEASES    Pre-eclampsia (HCC)    Pregnancy-induced hypertension (HCC)       ROS:   GENERAL: Negative for fever, chills and fatigue. NAD.  HENT: Negative for congestion, sore throat, and ear pain.  RESPIRATORY: Negative for cough, chest tightness, shortness of breath and wheezing.    CV: Negative for chest pain, palpitations and leg swelling.   GI: Negative for nausea, vomiting, abdominal pain, diarrhea, and blood in stool.   : Negative for dysuria, hematuria and difficulty  urinating.   MUSCULOSKELETAL: Negative for myalgias, back pain, joint swelling, arthralgias and gait problem.   NEURO: Negative for dizziness, syncope, weakness, numbness, tingling and headaches.   PSYCH: The patient is not nervous/anxious. No depression.      PHYSICAL EXAM:    /65   Pulse 100   Temp 97.6 °F (36.4 °C) (Temporal)   Ht 5' 7\" (1.702 m)   Wt 167 lb (75.8 kg)   LMP 10/14/2024 (Exact Date)   SpO2 99%   BMI 26.16 kg/m²     GENERAL: NAD. A&Ox3  RESPIRATORY: CTAB, no R/R/W  CV: RRR, no murmurs.   PSYCH: Appropriate mood and affect.      ASSESSMENT/ PLAN:   1. Migraine with aura and without status migrainosus, not intractable  Improved   Followed by neuro     2. Chiari malformation type I (HCC)  Stable   Followed by neurosurgery     *labs reviewed with pt, no significant abnormalities       Health Maintenance Due   Topic Date Due    Annual Physical  11/01/2023    Annual Depression Screening  01/01/2024    COVID-19 Vaccine (5 - 2023-24 season) 09/01/2024    Influenza Vaccine (1) 10/01/2024           Pt indicates understanding and agrees to the plan.     No follow-ups on file.    Anna Jaimes PA-C           [1]   Allergies  Allergen Reactions    Pcns [Penicillins] HIVES    Triptans OTHER (SEE COMMENTS)     History of complex migraine (right sided sensory change, language change)- relative contraindication

## 2024-11-18 ENCOUNTER — OFFICE VISIT (OUTPATIENT)
Dept: RHEUMATOLOGY | Facility: CLINIC | Age: 39
End: 2024-11-18
Payer: COMMERCIAL

## 2024-11-18 VITALS
WEIGHT: 175 LBS | OXYGEN SATURATION: 99 % | TEMPERATURE: 97 F | DIASTOLIC BLOOD PRESSURE: 78 MMHG | BODY MASS INDEX: 27.47 KG/M2 | HEIGHT: 67 IN | SYSTOLIC BLOOD PRESSURE: 118 MMHG | HEART RATE: 67 BPM | RESPIRATION RATE: 16 BRPM

## 2024-11-18 DIAGNOSIS — F31.12 BIPOLAR AFFECTIVE DISORDER, CURRENTLY MANIC, MODERATE (HCC): ICD-10-CM

## 2024-11-18 DIAGNOSIS — F40.8 OTHER PHOBIC ANXIETY DISORDERS: ICD-10-CM

## 2024-11-18 DIAGNOSIS — I87.2 VENOUS INSUFFICIENCY OF BOTH LOWER EXTREMITIES: ICD-10-CM

## 2024-11-18 DIAGNOSIS — I73.00 RAYNAUD'S PHENOMENON WITHOUT GANGRENE: Primary | ICD-10-CM

## 2024-11-18 DIAGNOSIS — J01.00 ACUTE MAXILLARY SINUSITIS, RECURRENCE NOT SPECIFIED: ICD-10-CM

## 2024-11-18 DIAGNOSIS — M79.18 MYOFASCIAL PAIN: ICD-10-CM

## 2024-11-18 DIAGNOSIS — G43.109 MIGRAINE WITH AURA AND WITHOUT STATUS MIGRAINOSUS, NOT INTRACTABLE: ICD-10-CM

## 2024-11-18 PROBLEM — M54.50 ACUTE MIDLINE LOW BACK PAIN WITHOUT SCIATICA: Status: RESOLVED | Noted: 2022-08-08 | Resolved: 2024-11-18

## 2024-11-18 PROBLEM — M54.9 UPPER BACK PAIN ON RIGHT SIDE: Status: RESOLVED | Noted: 2022-08-08 | Resolved: 2024-11-18

## 2024-11-18 PROCEDURE — 99214 OFFICE O/P EST MOD 30 MIN: CPT | Performed by: INTERNAL MEDICINE

## 2024-11-18 RX ORDER — CHOLECALCIFEROL (VITAMIN D3) 125 MCG
CAPSULE ORAL
COMMUNITY
Start: 2024-10-01

## 2024-11-18 RX ORDER — RIBOFLAVIN (VITAMIN B2) 400 MG
TABLET ORAL
COMMUNITY
Start: 2024-09-01

## 2024-11-18 NOTE — PROGRESS NOTES
[address]      Consult     Anusha Cho Patient Status:  No patient class for patient encounter    1985 MRN [id number]   Location [address] Attending No att. providers found   Hosp Day # 0 PCP Arin Santos MD     Referring Provider: PCP    Reason for Consultation: Raynaud's without digital ulcerations    Subjective:    Anusha Cho is a 39 year old female with come in for further evaluation of red eyelids concerns of ?blepharitis? Unclear etiology ; gave drops (steroids) dry eyes? (Prednisilone eye drops) 2023 a few weeks and redness ;     Some dry eyes; her ophthalmologist put in plugs from dry eyes (and its better); some swelling; puffy not as bad as before (came up) again 4 days ago but subsided on own    Has seasonal allergies (on this year) (external) flonase; zyrtec (on flonase) not zyrtec few weeks    Takes adderall in college then on back again in college (a month ago) 15 to 20mg; with dose increase only because of the shortage    1.5-2 years ago had gone to get spider veins taken care of; venous insufficiency (no procedure) borderline not approval; monitoring (minor level)    So occasionally get edema and swelling depending on standing and what she does; uses compression stockings     Has history of pre eclampsia; both pregnancy; 1 month early; second baby full term (not second) (venous insufficiency) no miscarriages    HRV (variant) 20-40)    Has seen derm and podiatrist     Has slight ingrown nail ; for podiatrist; and incidentally Raynauds; has some burning color; white; redness; bluish; podiatrist     Color comes back; no ulcers; no gangrene; mild skin breakage;    Saw derm who said she had mild rocasea (face) on the knee patterned marking (not elevated) things     Also podiatrist stated possible fungal infection of the feet toenails were clipped and ingrown toenails and sent for cultures  and started on topical fungal cream    Family hx of RA. Occasional puffiness of face     Hx of COVID recently Halloween (body achines, flu like) ? Flu as well. Took achiness and fatigue still there; Just texted negative recently     No shortness of breath now; more fatigues than usual.     Occasional shoulders pain, no elbows , wrists or hands    Denies any  knee pain; on off  low back; no hip pain    Denies any major issues with her neck    Denies any history of DVT PE TIA CVA seizures +migraines and  headaches; rare now; (since second child 2021) 1 every 3 months; goes away with exedrin migraine (aura) dark room; goes fairly quickly; Had then worse in mid 20s; (neurologist) was on topamax (emotional problems) made it better) then after first born off meds (improved it)     States no shortness of breath ,chest pain ,fevers ,chills    States no urinary or bowel symptoms; bloody stools    States no jaw pain, vision changes (very difficult time doing eye exam)     States no history of pericardial, pleural effusions     +dry eyes +dry mouth (no recent cavities) last year 1; cavity     States no history of uveitis iritis scleritis    + Raynaud's NO digital ulcerations    States no major weight changes; night sweats (lost 10 lbs in 1 week) from covid; steady now     Her weight has been stable    States no history of photosensitive or malar rash.(Rosea)     States no history of psoriasis    Denies any depression anxiety or insomnia (anxiety) adderall improved it;;     In the past xanax prn; occasional nonrestorastive sleep; fatigue; not an issue    NO physical limitationl    Kids 5 or 2 year old (2 boys)     Drink socially weekends a few drinks    Works in marketing remote (desk job)     Patient seen as a new patient November 2023    Diagnosed with likely venous insufficiency and Raynaud's without digital ulcerations    Extensive autoimmune workup unrevealing    Patient declined any medications for Raynaud's    Overall  she states she has been doing well  With minimal symptoms.  We had offered sildenafil a year ago    She continues to follow-up with neurology in regards to her migraine headaches is currently on biologic treatment with improvement at the last 3 months through her neurologist    She is also seen a neurosurgeon and had updated imaging with MRI of the brain cervical thoracic lumbar spine in the last year        History/Other:        Past Medical History:  Past Medical History:    ADHD    Migraines    OTHER DISEASES    Pre-eclampsia (HCC)    Pregnancy-induced hypertension (HCC)        Past Surgical History:   Past Surgical History:   Procedure Laterality Date    Impact tooth rem bony w/comp Bilateral 2007    wisdom teeth x 4       Social History:  reports that she has never smoked. She has never used smokeless tobacco. She reports current alcohol use of about 2.0 standard drinks of alcohol per week. She reports that she does not use drugs.    Family History:   Family History   Problem Relation Age of Onset    Hypertension Mother     High Cholesterol Mother     Arthritis Mother         RA    Hypertension Maternal Grandmother     High Cholesterol Maternal Grandmother     Diabetes Maternal Grandmother     Dementia Maternal Grandmother     Arthritis Maternal Grandmother         RA    Allergies Sister     Heart Disease Maternal Grandfather     Cancer Paternal Grandfather         Pancreatic    Hypertension Maternal Aunt     High Cholesterol Maternal Aunt     Diabetes Maternal Aunt     Other (Prostate Cancer) Maternal Aunt         Pat uncle    Arthritis Maternal Aunt         RA    Arthritis Maternal Cousin Female         RA       Allergies:   Allergies   Allergen Reactions    Pcns [Penicillins] HIVES    Triptans OTHER (SEE COMMENTS)     History of complex migraine (right sided sensory change, language change)- relative contraindication        Current Medications:  Current Outpatient Medications   Medication Sig Dispense Refill     Cholecalciferol (VITAMIN D) 125 MCG (5000 UT) Oral Cap       Coenzyme Q10 (COQ-10) 100 MG Oral Cap       Riboflavin 400 MG Oral Tab       Turmeric (QC TUMERIC COMPLEX OR)       ondansetron (ZOFRAN) 4 mg tablet Take 1 tablet (4 mg total) by mouth every 8 (eight) hours as needed for Nausea. 30 tablet 5    Amphetamine-Dextroamphet ER 20 MG Oral Capsule SR 24 Hr Take 1 capsule (20 mg total) by mouth daily.      Rimegepant Sulfate (NURTEC) 75 MG Oral Tablet Dispersible Take 75 mg by mouth as needed. Take one tablet at onset of migraine.  Maximum dose in 24 hours is 1 tablet (75mg). 8 tablet 11    fluticasone propionate 50 MCG/ACT Nasal Suspension 2 sprays by Nasal route daily. 16 g 0    cetirizine-pseudoephedrine ER 5-120 MG Oral Tablet 12 Hr Take 1 tablet by mouth 2 (two) times daily. 30 tablet 0    LORazepam 0.5 MG Oral Tab Take 1 tablet (0.5 mg total) by mouth daily as needed.      Multiple Vitamin Oral Tab Take 1 tablet by mouth daily.        No current facility-administered medications for this visit.       (Not in a hospital admission)      Review of Systems:     Constitutional: Negative for chills, +fatigue, no fever and unexpected weight change.    HENT: Negative for congestion, and mouth sores.    Eyes: Negative for photophobia, pain, redness and visual disturbance.    Respiratory: Negative for apnea, cough, chest tightness, shortness of breath, wheezing and stridor.    Cardiovascular: Negative for chest pain, palpitations and leg swelling.    Gastrointestinal: Negative for abdominal distention, abdominal pain, blood in stool, constipation, diarrhea and nausea.    Endocrine: Negative.     Genitourinary: Negative for decreased urine volume, difficulty urinating, dyspareunia, dysuria, flank pain, and frequency.    Musculoskeletal: no arthralgias, no gait problem and joint swelling.    Skin: +color change, pallor and no rash. + raynauds NO digital ulcerations no sclerodactly.    Allergic/Immunologic:  Negative.    Neurological: Negative for dizziness, tremors, seizures, syncope, speech difficulty, weakness, light-headedness, numbness and headaches.    Hematological: Does not bruise/bleed easily.    Psychiatric/Behavioral: Negative for confusion, decreased concentration, hallucinations, self-injury, occasional sleep disturbance no suicidal ideas or depression.    Objective:   [unfilled]  Vitals:    11/18/24 0950   BP: 118/78   Pulse: 67   Resp: 16   Temp: 97.3 °F (36.3 °C)          Constitutional: is oriented to person, place, and time. Appears well-developed and well-nourished. No distress.    HEENT: Normocephalic; EOMI; no jvd; no LAD; no oral or nasal ulcers.     Eyes: Conjunctivae and EOM are normal. Pupils are equal, round, and reactive to light.     Neck: Normal range of motion. No thyromegaly present.    Cardiovascular: RRR, no murmurs.    Lungs: Clear, Bilateral air entry, no wheezes.    Abdominal: Soft.    Musculoskeletal:    [facility]. Go to the Rheumatology activity and complete the [facility] joint exam.     Joint Exam 11/18/2024     No joint exam has been documented for this visit        Swollen: -- 0    Tender: -- 0        Right shoulder: Exhibits normal range of motion on abduction and internal rotation, no tenderness, no bony tenderness, no deformity, no laceration, no pain and no spasm.        Left shoulder: Exhibits normal range of motion on abduction and internal and external rotation.  no tenderness, no bony tenderness, no swelling, no effusion, no deformity, no pain, no spasm and normal strength.        Right elbow:  Exhibits normal range of motion, no swelling, no effusion and no deformity. No tenderness found. No medial epicondyle, no lateral epicondyle and no olecranon process tenderness noted. There are no contractures or tophi or nodules.        Left elbow:  Normal range of motion, no swelling, no effusion and no deformity. No  medial epicondyle, no lateral epicondyle and no olecranon process tenderness noted. There are no contractures or tophi or nodules.        Right wrist:  Exhibits normal range of motion, no tenderness, no bony tenderness, no swelling, no effusion and no crepitus. Flexion and extension intact w/o limitation.        Left wrist: Exhibits normal range of motion, no tenderness, no bony tenderness, no swelling, no effusion, no crepitus and no deformity. Flexion and extension intact without limitation.        Right hip: Exhibits normal range of motion, normal strength, no tenderness, no bony tenderness, no swelling and no crepitus.        Right hand: No synovitis of MCP,PIP or DIP joints; no Bouchards or Heberden nodules noted;  strength: 100%.        Left hand: No synovitis of MCP,PIP or DIP joints; no Bouchards or Heberden nodules noted;  strength: 100%.        Left hip: Exhibits normal range of motion, normal strength, no tenderness, no bony tenderness, No swelling and no crepitus.        Right knee: Exhibits normal range of motion, no swelling, no effusion, no ecchymosis, no deformity and no erythema. No tenderness found. No medial joint line, no lateral joint line, no MCL and no LCL tenderness noted. No crepitation on flexion of knee and extension normal.        Left knee:  Exhibits normal range of motion, no swelling, no effusion, no ecchymosis and no erythema. No tenderness found. No medial joint line, no lateral joint line and no patellar tendon tenderness noted. No crepitation on flexion of the knee. Extension intact and normal.        Right ankle: No swelling, no deformity. No tenderness. Dorsiflexion and plantar flexion intact without limitation in range of motion.        Left ankle: Exhibits no swelling. No tenderness. No lateral malleolus and no medial malleolus tenderness found. Achilles tendon normal. Achilles tendon exhibits no pain, no defect and normal Hernandez's test results.  Dorsiflexion and  plantar flexion intact without limitation in range of motion.        Cervical back: Exhibits normal range of motion, no tenderness, no bony tenderness, no swelling, no pain and no spasm.        Thoracic back: Exhibits normal range of motion, no tenderness, no bony tenderness and no spasm.        Lumbar back:  Exhibits normal range of motion, no tenderness, no bony tenderness, no pain and no spasm.        Right foot: normal. There is normal range of motion, no tenderness, no bony tenderness, no crepitus and no laceration. There is no synovitis or tenderness of the MTP joints to palpation.  Mild hammertoe deformities of the MTP joints        Left foot: normal. There is normal range of motion, no tenderness, no bony tenderness and no crepitus. There is no synovitis or tenderness of the MTP joints to palpation.  Mild hammertoe deformities    Lymphadenopathy: No submental, no submandibular, and no occipital adenopathy present, has no cervical adenopathy or axillary lympadenopathy.    Neurological: Alert and oriented. No focal motor or sensory abnormalities. Strength is 5/5 Upper Extremities/Lower Extremities proximally and distally.    Skin: Skin is warm, dry and intact.  Raynaud's positive feet; onychomycosis of the nailbeds?  Trauma?    Psychiatric: Normal behavior.    Results:    Labs:      Lab Results   Component Value Date    WBC 9.4 05/16/2024    RBC 4.60 05/16/2024    HGB 14.1 05/16/2024    HCT 41.5 05/16/2024    MCV 90.2 05/16/2024    MCH 30.7 05/16/2024    MCHC 34.0 05/16/2024    RDW 11.8 05/16/2024    .0 05/16/2024    NEUT 83 08/08/2018       No components found for: \"RELY\", \"NMET\", \"MYEL\", \"PROMY\", \"LITA\", \"ABSNEUTS\", \"ABSBANDS\", \"ABMM\", \"ABMY\", \"ABPM\", \"ABBL\"      Lab Results   Component Value Date     05/16/2024    K 3.8 05/16/2024    CO2 26.0 05/16/2024    BUN 12 05/16/2024    GFR >59 01/28/2011    ALB 4.2 05/16/2024    AST 16 09/27/2024    ALT 15 09/27/2024          No components found for:  \"ESRWESTERGRN\"       Lab Results   Component Value Date    CRP <0.29 10/24/2023         Lab Results   Component Value Date    CLARITY Clear 09/23/2021    UROBILINOGEN <2.0 09/23/2021     @LABRCNTIP(RF,B12)@      [unfilled]    Imaging:  No results found.    Assessment & Plan:      39-year-old woman comes in for further evaluation for Raynaud's without digital ulcerations    Raynaud's without digital ulceration could be drug-induced from Adderall  Secondary workup autoimmune testing normal  Sicca syndrome  Diagnosis of varicose veins with venous insufficiency bilateral lower extremities    Patient has no obvious synovitis in exam  History is not concerning for inflammatory arthritis or other connective tissue disease Sjogren's testing negative  Recent RF CCP negative  She was given Nitropaste?  For Raynaud's of the feet from podiatry; she has not tried this yet.  If she cannot tolerate this I have offered sildenafil.  Core measures discussed for treatment of Raynaud's.  She does not feel like her symptoms are bad enough for medications at this time  Extensive autoimmune workup and reveal    Vitamin D deficiency she would like to repeat this    Because of family history of rheumatoid arthritis she would like to repeat sed rate CRP    If her testing is normal we will see her back on a once year basis to monitor for evolving connective tissue disease.    If her testing is concerning we will see her back in 6 months to monitor for changes    If she changes her mind on considering other options for treatment of Raynaud's she is more than willing to call our office or come in sooner.    Patient agrees with this plan.    Has occasional aches and pains suspect myofascial pain syndrome    Does not feel symptoms are bad enough for further testing recent MRI of the spine reviewed and neck.  Followed by neurology    Consider pain management referral if pain worsens    Education and counseling provided to patient.  Instructed  patient to call my office or seek medical attention immediately if symptoms worsen. Risks and side effects of medications and diagnosis discussed in detail and patient was given written information on new prescribed medications.    Return to clinic:  Return in about 1 year (around 11/18/2025).    Trang Gant MD  11/15/2023

## 2024-11-18 NOTE — PATIENT INSTRUCTIONS
Diagnosis    Palindromic rheumatism is rare, so your GP may not have seen many cases. It can sometimes be confused with conditions like gout and rheumatoid arthritis.    To confirm the diagnosis and to ensure that treatment is started as soon as possible, your GP should refer you to a rheumatologist (miguel), who is a consultant with specialist knowledge of these types of conditions.              How will palindromic rheumatism affect me?    Palindromic rheumatism varies from person to person. Some people find that their symptoms completely disappear between attacks, while others only have attacks occasionally.    However, some people experience more problems over time, and may develop rheumatoid arthritis. This is particularly likely in people whose blood tests show rheumatoid factor or anti-CCP, which can be positive in rheumatoid arthritis.    While palindromic rheumatism doesn't cause any permanent damage to the joints, rheumatoid arthritis can. Even if you have a positive test result for these antibodies, you won't necessarily develop rheumatoid arthritis.    Very rarely, a small number of people develop lupus, and this is more likely in people whose blood tests show anti-nuclear antibodies.         Drugs    The main treatments for palindromic rheumatism are drugs to treat the pain, drugs to reduce inflammation, and drugs to treat the condition itself. People respond to treatments in different ways, so it's important to work with your doctors to find the best treatment for you.    Non-steroidal anti-inflammatory drugs (NSAIDs)    Non-steroidal anti-inflammatory drugs block inflammation and are used to reduce pain, stiffness and inflammation during attacks.    Your symptoms may improve when you take these drugs, but the effects aren't long-lasting, so you might have to take them regularly. If they do work, you'll need to take them as soon as an attack starts and carry on until after it's  finished.    There are many NSAIDs available, so your doctor will advise you on the best choices for you. Examples of NSAIDs include naproxen and ibuprofen.    NSAIDs can sometimes have side effects, but your doctor will try to reduce the risk of these. This could be giving you the lowest dose that works for you for the shortest possible time, or by prescribing a type of drug called a proton pump inhibitor, which can help reduce the risk of stomach problems.    Steroid injections    Your doctor might suggest a steroid injection if your joints are very painful, or if your ligaments and tendons are inflamed. Steroid injections can be given directly into a joint or a muscle.    These injections aren't given regularly. They work very quickly, usually within a few days. Some GPs can give them, but they'll usually be given by your consultant or nurse at the hospital.    Disease-modifying anti-rheumatic drugs (DMARDs)    Disease-modifying anti-rheumatic drugs help by tackling the causes of joint inflammation. They're used in palindromic rheumatism to reduce symptoms and flare-ups.    Unlike the other drugs mentioned here, DMARDs treat the condition itself rather than just reducing the pain and stiffness it causes.    DMARDs can be used to prevent attacks or reduce the frequency of them in people who have a more severe form of the condition. They can sometimes take a while to start working, so your doctor will advise you to keep taking them regularly.    Overview    Myofascial pain syndrome is a chronic pain disorder. In this condition, pressure on sensitive points in your muscles (trigger points) causes pain in the muscle and sometimes in seemingly unrelated parts of your body. This is called referred pain.    This syndrome typically occurs after a muscle has been contracted repetitively. This can be caused by repetitive motions used in jobs or hobbies or by stress-related muscle tension.    While nearly everyone has  experienced muscle tension pain, the discomfort associated with myofascial pain syndrome persists or worsens. Treatment options include physical therapy and trigger point injections. Pain medications and relaxation techniques also can help.         Symptoms    Signs and symptoms of myofascial pain syndrome may include:    Deep, aching pain in a muscle    Pain that persists or worsens    A tender knot in a muscle    Difficulty sleeping due to pain    When to see a doctor    Make an appointment with your doctor if you experience muscle pain that doesn't go away. Nearly everyone experiences muscle pain from time to time. But if your muscle pain persists despite rest, massage and similar self-care measures, make an appointment with your doctor.    Causes    Sensitive areas of tight muscle fibers can form in your muscles after injuries or overuse. These sensitive areas are called trigger points. A trigger point in a muscle can cause strain and pain throughout the muscle. When this pain persists and worsens, doctors call it myofascial pain syndrome.    Myofascial pain syndrome is caused by a stimulus, such as muscle tightness, that sets off trigger points in your muscles. Factors that may increase your risk of muscle trigger points include:    Muscle injury. An acute muscle injury or continual muscle stress may lead to the development of trigger points. For example, a spot within or near a strained muscle may become a trigger point. Repetitive motions and poor posture also may increase your risk.    Stress and anxiety. People who frequently experience stress and anxiety may be more likely to develop trigger points in their muscles. One theory holds that these people may be more likely to clench their muscles, a form of repeated strain that leaves muscles susceptible to trigger points.    Complications    Complications associated with myofascial pain syndrome may include:    Sleep problems. Signs and symptoms of myofascial  pain syndrome may make it difficult to sleep at night. You may have trouble finding a comfortable sleep position. And if you move at night, you might hit a trigger point and awaken.    Fibromyalgia. Some research suggests that myofascial pain syndrome may develop into fibromyalgia in some people. Fibromyalgia is a chronic condition that features widespread pain. It's believed that the brains of people with fibromyalgia become more sensitive to pain signals over time. Some doctors believe myofascial pain syndrome may play a role in starting this process.        The most common DMARD used to prevent attacks of palindromic rheumatism is hydroxychloroquine. However, some people may need stronger DMARDs like sulfasalazine or methotrexate.    When taking some DMARDs, you'll sometimes need to have regular blood monitoring to check for possible side effects, including problems with your liver, kidneys or blood count.

## 2025-01-28 ENCOUNTER — HOSPITAL ENCOUNTER (OUTPATIENT)
Age: 40
Discharge: HOME | End: 2025-01-28
Payer: COMMERCIAL

## 2025-01-28 ENCOUNTER — APPOINTMENT (OUTPATIENT)
Dept: GENERAL RADIOLOGY | Age: 40
End: 2025-01-28
Attending: NURSE PRACTITIONER
Payer: COMMERCIAL

## 2025-01-28 ENCOUNTER — NURSE TRIAGE (OUTPATIENT)
Dept: INTERNAL MEDICINE CLINIC | Facility: CLINIC | Age: 40
End: 2025-01-28

## 2025-01-28 VITALS
DIASTOLIC BLOOD PRESSURE: 83 MMHG | OXYGEN SATURATION: 99 % | BODY MASS INDEX: 26.37 KG/M2 | WEIGHT: 168 LBS | HEIGHT: 67 IN | TEMPERATURE: 99 F | RESPIRATION RATE: 20 BRPM | SYSTOLIC BLOOD PRESSURE: 120 MMHG | HEART RATE: 104 BPM

## 2025-01-28 DIAGNOSIS — B34.9 VIRAL SYNDROME: Primary | ICD-10-CM

## 2025-01-28 LAB
ATRIAL RATE: 109 BPM
P AXIS: 70 DEGREES
P-R INTERVAL: 132 MS
POCT INFLUENZA A: NEGATIVE
POCT INFLUENZA B: NEGATIVE
Q-T INTERVAL: 320 MS
QRS DURATION: 72 MS
QTC CALCULATION (BEZET): 430 MS
R AXIS: 39 DEGREES
SARS-COV-2 RNA RESP QL NAA+PROBE: NOT DETECTED
T AXIS: 49 DEGREES
VENTRICULAR RATE: 109 BPM

## 2025-01-28 PROCEDURE — 93005 ELECTROCARDIOGRAM TRACING: CPT

## 2025-01-28 PROCEDURE — 99214 OFFICE O/P EST MOD 30 MIN: CPT

## 2025-01-28 PROCEDURE — 71046 X-RAY EXAM CHEST 2 VIEWS: CPT | Performed by: NURSE PRACTITIONER

## 2025-01-28 PROCEDURE — 93010 ELECTROCARDIOGRAM REPORT: CPT

## 2025-01-28 PROCEDURE — 87502 INFLUENZA DNA AMP PROBE: CPT | Performed by: NURSE PRACTITIONER

## 2025-01-28 RX ORDER — BENZONATATE 100 MG/1
100 CAPSULE ORAL 3 TIMES DAILY PRN
Qty: 30 CAPSULE | Refills: 0 | Status: SHIPPED | OUTPATIENT
Start: 2025-01-28 | End: 2025-02-27

## 2025-01-28 RX ORDER — BENZONATATE 100 MG/1
100 CAPSULE ORAL 3 TIMES DAILY PRN
Qty: 30 CAPSULE | Refills: 0 | Status: SHIPPED | OUTPATIENT
Start: 2025-01-28 | End: 2025-01-30

## 2025-01-28 NOTE — ED INITIAL ASSESSMENT (HPI)
Presents with productive cough causing chest discomfort. Episodes of coughing cause shortness of breath and mild dizziness. Now having chills, body aches, fatigue.

## 2025-01-28 NOTE — ED PROVIDER NOTES
Patient Seen in: [facility]      History     Chief Complaint   Patient presents with    Cough     Stated Complaint: Chest Pain - Doc recommended ER, starting here first. Post nasal drip. Deep muc*    Subjective:   HPI      Patient is a 39-year-old female who is here today with complaints of bodyaches, cough, postnasal drip that started approximately 4 to 5 hours ago    Objective:     Past Medical History:    ADHD    Migraines    OTHER DISEASES    Pre-eclampsia (HCC)    Pregnancy-induced hypertension (HCC)              Past Surgical History:   Procedure Laterality Date    Impact tooth rem bony w/comp Bilateral 2007    wisdom teeth x 4                Social History     Socioeconomic History    Marital status:      Spouse name: Xavier    Number of children: 0    Years of education: 16   Occupational History    Occupation: Product management   Tobacco Use    Smoking status: Never    Smokeless tobacco: Never   Vaping Use    Vaping status: Never Used   Substance and Sexual Activity    Alcohol use: Yes     Alcohol/week: 2.0 standard drinks of alcohol     Types: 2 Standard drinks or equivalent per week     Comment: occasional    Drug use: No    Sexual activity: Not Currently     Partners: Male   Other Topics Concern     Service No    Blood Transfusions No    Caffeine Concern Yes     Comment: 1-2 cup coffee daily    Occupational Exposure No    Hobby Hazards No    Sleep Concern No    Back Care No    Exercise No    Bike Helmet No    Self-Exams Yes   Social History Narrative    Lives with               Review of Systems    Positive for stated complaint: Chest Pain - Doc recommended ER, starting here first. Post nasal drip. Deep muc*  Other systems are as noted in HPI.  Constitutional and vital signs reviewed.      All other systems reviewed and negative except as noted above.    Physical Exam     ED Triage Vitals [01/28/25 1445]   /83   Pulse 104   Resp 20   Temp 99 °F (37.2 °C)   Temp  src Oral   SpO2 99 %   O2 Device None (Room air)       Current Vitals:   Vital Signs  BP: 120/83  Pulse: 104  Resp: 20  Temp: 99 °F (37.2 °C)  Temp src: Oral    Oxygen Therapy  SpO2: 99 %  O2 Device: None (Room air)        Physical Exam  VS: Vital signs reviewed. O2 saturation within normal limits for this patient     General: Patient is awake and alert, oriented to person, place and time. Not in acute distress.      HEENT: Head is normocephalic atraumatic. Pupils reactive bilaterally.  EOMs intact.  No facial droop or slurred speech.  No oral pallor. Mucous membranes moist.      Neck: No cervical lymphadenopathy. No stridor. Supple. No meningsmus.      Heart: S1-S2.  Regular rate and rhythm.       Lungs: good inspiratory effort. +air entry bilaterally without wheezes, rhonchi, crackles.  No accessory muscle use or tachypnea.       Abdomen: Soft, nontender, nondistended.  Active bowel sounds present.       Back: No CVA tenderness.     Extremities: No edema.  Pulses 2+ extremities.   Brisk capillary refill noted.      Skin: Normal skin turgor     CNS: Moves all 4 extremities.  Interacts appropriately.  No tremor.  No gait abnormality          ED Course     Labs Reviewed   POCT FLU TEST - Normal    Narrative:     This assay is a rapid molecular in vitro test utilizing nucleic acid amplification of influenza A and B viral RNA.   RAPID SARS-COV-2 BY PCR - Normal     EKG    Rate, intervals and axes as noted on EKG Report.  Rate: 109   Rhythm: Sinus Rhythm  Reading: EKG shows a sinus tachycardia at a rate of 109, NC interval 132, QRS of 72.                I have personally  reviewed available prior medical records for any recent pertinent discharge summaries/testing. Patient/family updated on results and plan, a verbalized understanding and agreement with the plan.  I explained to the patient that emergent conditions may arise and to go to the ER for new, worsening or any persistent conditions. I've explained the  importance of taking all medicatons as prescribed, follow up, and return precuations,  All questions answered.    Please note that this report has been produced using speech recognition software and may contain errors related to that system including, but not limited to, errors in grammar, punctuation, and spelling, as well as words and phrases that possibly may have been recognized inappropriately.  If there are any questions or concerns, contact the dictating provider for clarification.       MDM      Patient is well-appearing, well-hydrated, well-nourished, nontoxic, there is no distress noted.  Patient is mildly tachycardic however she does have a temp of 99 degrees.  Although her influenza test came back negative, I does not believe that she is positive for the flu.  I did encourage her to do some symptomatic control.  Over-the-counter Tylenol, ibuprofen for pain/fever.  Patient        Medical Decision Making      Disposition and Plan     Clinical Impression:  1. Viral syndrome         Disposition:  Discharge  1/28/2025  4:23 pm    Follow-up:  Arin Santos MD  [address] [place] [address]  [phone]                Medications Prescribed:  Discharge Medication List as of 1/28/2025  4:26 PM        START taking these medications    Details   !! benzonatate 100 MG Oral Cap Take 1 capsule (100 mg total) by mouth 3 (three) times daily as needed for cough., Normal, Disp-30 capsule, R-0      !! benzonatate 100 MG Oral Cap Take 1 capsule (100 mg total) by mouth 3 (three) times daily as needed for cough., Normal, Disp-30 capsule, R-0       !! - Potential duplicate medications found. Please discuss with provider.              Supplementary Documentation:

## 2025-01-28 NOTE — TELEPHONE ENCOUNTER
Action Requested: Summary for Provider     []  Critical Lab, Recommendations Needed  [] Need Additional Advice  [x]   FYI    []   Need Orders  [] Need Medications Sent to Pharmacy  []  Other     SUMMARY: ER 1/28    Reason for call: cough  Onset: 2 days    Fatigue, occasional cough. Worsening today with increased cough and mucous production, SOB, low grade fever at 100.0, chest pain with cough, lightheadedness.   Per pt, O2 @98%.  Advised ER for further eval/work-up and tx. Pt will follow up office visit if symptoms fail to improve, or any other concerns.  No further questions. Pt verbalized understanding and agreed with POC.    Reason for Disposition   Chest pain present when not coughing    Protocols used: Cough-A-OH

## 2025-01-30 ENCOUNTER — NURSE TRIAGE (OUTPATIENT)
Dept: INTERNAL MEDICINE CLINIC | Facility: CLINIC | Age: 40
End: 2025-01-30

## 2025-01-30 ENCOUNTER — OFFICE VISIT (OUTPATIENT)
Dept: INTERNAL MEDICINE CLINIC | Facility: CLINIC | Age: 40
End: 2025-01-30
Payer: COMMERCIAL

## 2025-01-30 VITALS
TEMPERATURE: 100 F | DIASTOLIC BLOOD PRESSURE: 70 MMHG | OXYGEN SATURATION: 96 % | BODY MASS INDEX: 26.24 KG/M2 | HEART RATE: 100 BPM | WEIGHT: 167.19 LBS | SYSTOLIC BLOOD PRESSURE: 100 MMHG | RESPIRATION RATE: 20 BRPM | HEIGHT: 67 IN

## 2025-01-30 DIAGNOSIS — R05.1 ACUTE COUGH: ICD-10-CM

## 2025-01-30 DIAGNOSIS — H66.93 ACUTE BACTERIAL OTITIS MEDIA, BILATERAL: Primary | ICD-10-CM

## 2025-01-30 PROCEDURE — 99213 OFFICE O/P EST LOW 20 MIN: CPT | Performed by: INTERNAL MEDICINE

## 2025-01-30 RX ORDER — AZITHROMYCIN 250 MG/1
TABLET, FILM COATED ORAL
Qty: 6 TABLET | Refills: 0 | Status: SHIPPED | OUTPATIENT
Start: 2025-01-30 | End: 2025-02-04

## 2025-01-30 NOTE — PROGRESS NOTES
Anusha Cho is a 39 year old female.    Chief Complaint   Patient presents with    Urgent Care F/u     Follow up from urgent care c/o severe cough and fever and bodyaches taking OTC meds for fever c/o sore throat and headaches also left ear pain        HPI:     Patient here for f/u for cough, high fevers, ear pain, and congestion. Symptoms started 4 days ago but worse last 2 days. She went to urgent care on 1/28, covid 19 and influenza ruled out. CXR was negative. She was given benzonatate prn. Her fevers are running high (103.8F) so taking tylenol and motrin. Temp in the office 99.6F    Patient Active Problem List   Diagnosis    Migraine with aura and without status migrainosus, not intractable    Raynaud's phenomenon without gangrene    Venous insufficiency of both lower extremities    Myofascial pain     Current Outpatient Medications   Medication Sig Dispense Refill    azithromycin (ZITHROMAX Z-ADALBERTO) 250 MG Oral Tab Take 2 tablets (500 mg total) by mouth daily for 1 day, THEN 1 tablet (250 mg total) daily for 4 days. 6 tablet 0    benzonatate 100 MG Oral Cap Take 1 capsule (100 mg total) by mouth 3 (three) times daily as needed for cough. 30 capsule 0    Cholecalciferol (VITAMIN D) 125 MCG (5000 UT) Oral Cap       Coenzyme Q10 (COQ-10) 100 MG Oral Cap       Riboflavin 400 MG Oral Tab       Turmeric (QC TUMERIC COMPLEX OR)       ondansetron (ZOFRAN) 4 mg tablet Take 1 tablet (4 mg total) by mouth every 8 (eight) hours as needed for Nausea. 30 tablet 5    Amphetamine-Dextroamphet ER 20 MG Oral Capsule SR 24 Hr Take 1 capsule (20 mg total) by mouth daily.      Rimegepant Sulfate (NURTEC) 75 MG Oral Tablet Dispersible Take 75 mg by mouth as needed. Take one tablet at onset of migraine.  Maximum dose in 24 hours is 1 tablet (75mg). 8 tablet 11    fluticasone propionate 50 MCG/ACT Nasal Suspension 2 sprays by Nasal route daily. 16 g 0    cetirizine-pseudoephedrine ER 5-120 MG Oral Tablet 12 Hr Take 1 tablet by  mouth 2 (two) times daily. 30 tablet 0    LORazepam 0.5 MG Oral Tab Take 1 tablet (0.5 mg total) by mouth daily as needed.        Past Medical History:    ADHD    Migraines    OTHER DISEASES    Pre-eclampsia (HCC)    Pregnancy-induced hypertension (HCC)      Social History:  Social History     Socioeconomic History    Marital status:      Spouse name: Xavier    Number of children: 0    Years of education: 16   Occupational History    Occupation: Product management   Tobacco Use    Smoking status: Never    Smokeless tobacco: Never   Vaping Use    Vaping status: Never Used   Substance and Sexual Activity    Alcohol use: Yes     Alcohol/week: 2.0 standard drinks of alcohol     Types: 2 Standard drinks or equivalent per week     Comment: occasional    Drug use: No    Sexual activity: Not Currently     Partners: Male   Other Topics Concern     Service No    Blood Transfusions No    Caffeine Concern Yes     Comment: 1-2 cup coffee daily    Occupational Exposure No    Hobby Hazards No    Sleep Concern No    Back Care No    Exercise No    Bike Helmet No    Self-Exams Yes   Social History Narrative    Lives with      Family History   Problem Relation Age of Onset    Hypertension Mother     High Cholesterol Mother     Arthritis Mother         RA    Hypertension Maternal Grandmother     High Cholesterol Maternal Grandmother     Diabetes Maternal Grandmother     Dementia Maternal Grandmother     Arthritis Maternal Grandmother         RA    Allergies Sister     Heart Disease Maternal Grandfather     Cancer Paternal Grandfather         Pancreatic    Hypertension Maternal Aunt     High Cholesterol Maternal Aunt     Diabetes Maternal Aunt     Other (Prostate Cancer) Maternal Aunt         Pat uncle    Arthritis Maternal Aunt         RA    Arthritis Maternal Cousin Female         RA        Allergies  Allergies[1]      REVIEW OF SYSTEMS:   GENERAL HEALTH:  +fevers and chills  RESPIRATORY: + cough  CARDIOVASCULAR:  denies chest pain  GI: no n/v    EXAM:   /70   Pulse 100   Temp 99.6 °F (37.6 °C)   Resp 20   Ht 5' 7\" (1.702 m)   Wt 167 lb 3.2 oz (75.8 kg)   LMP 10/14/2024 (Exact Date)   SpO2 96%   BMI 26.19 kg/m²   GENERAL: well developed, well nourished,in no apparent distress  HEENT: atraumatic, normocephalic, b/l Tms injected +fluid, nares +discharge, OP-clear  NECK: supple,+ adenopathy  LUNGS: normal rate without respiratory distress, cta  CARDIO: RRR nl S1 S2  GI: normal bowel sounds, soft, NT/ND  EXTREMITIES: no cyanosis, clubbing or edema  NEURO: Alert and oriented    ASSESSMENT AND PLAN:     Encounter Diagnoses   Name     Acute bacterial otitis media, bilateral- zithromax as directed, tylenol or motrin prn pain and fevers     Acute cough- benzonatate prn, CXR reviewed from 1/28/25 ([facility])        No orders of the defined types were placed in this encounter.      Meds & Refills for this Visit:  Requested Prescriptions     Signed Prescriptions Disp Refills    azithromycin (ZITHROMAX Z-ADALBERTO) 250 MG Oral Tab 6 tablet 0     Sig: Take 2 tablets (500 mg total) by mouth daily for 1 day, THEN 1 tablet (250 mg total) daily for 4 days.       Imaging & Consults:  None    Return if symptoms worsen or fail to improve.  There are no Patient Instructions on file for this visit.      The patient indicates understanding of these issues and agrees to the plan.           [1]   Allergies  Allergen Reactions    Pcns [Penicillins] HIVES    Triptans OTHER (SEE COMMENTS)     History of complex migraine (right sided sensory change, language change)- relative contraindication

## 2025-01-30 NOTE — TELEPHONE ENCOUNTER
Action Requested: Summary for Provider     []  Critical Lab, Recommendations Needed  [] Need Additional Advice  []   FYI    []   Need Orders  [] Need Medications Sent to Pharmacy  []  Other     SUMMARY: Scheduled OV today at 3pm. Spouse not at home right now with pt, advised spouse if pt looks in distress or unable to come into office for evaluation, call back and would direct to ER at that time.     Reason for call: Cough/URI  Onset: Tuesday seen in IC   Reason for Disposition   MILD difficulty breathing (e.g., minimal/no SOB at rest, SOB with walking, pulse <100) and still present when not coughing    Protocols used: Cough-A-OH    Spouse Xavier called (on HIPAA)  Pt seen in IC Tuesday. Stated experience was \"horrible\". Forgot about her, did not go over results and home care advice  Covid and flu negative. Chest xray negative  Stated pt is worse  C/o body aches, coughing spells, temp 102 last night. Unsure temp today. Pt away from spouse and kids in bedroom  Taking Tylenol which brings down temp for a few hours  C/o breathing difficulties with coughing spell and activity   Denies vomiting. Keeping hydrated   Scheduled OV for this afternoon, however advised spouse if pt unable to come in or looks in distress, call us back and would direct to ER  Provided ER warning signs. Agreeable to plan.   Future Appointments   Date Time Provider Department Center   1/30/2025  3:00 PM Arin Santos MD [facility] [address] [facility]   2/18/2025  9:45 AM  [facility] (3T [facility]IDE[facility]   2/18/2025 11:15 AM  MR RM[facility] (3T W[facility]   11/19/2025  9:40 AM Trang Gant MD [id number] [address]

## 2025-02-07 ENCOUNTER — TELEPHONE (OUTPATIENT)
Dept: INTERNAL MEDICINE CLINIC | Facility: CLINIC | Age: 40
End: 2025-02-07

## 2025-02-07 NOTE — TELEPHONE ENCOUNTER
Last appointment 1/30/25.  Patient prescribed Z Pack for otitis media and acute cough. She states she was feeling a little better but now symptoms seem to be getting worse again. She reports left side nasal congestion, some puffy swelling in sinuses, continued productive cough. Temp was 99.4.    She is wondering if she needs another prescription, should she be seen again? Looking for any further recs.

## 2025-02-10 NOTE — TELEPHONE ENCOUNTER
Patient has upcoming appointment     Future Appointments   Date Time Provider Department Center   2/12/2025 11:20 AM Arin Santos MD [facility] [address] [facility]   2/18/2025 10:00 AM  MR RM4 (3T WIDE)  [facility]   2/18/2025 11:30 AM  MR RM4 (3T WIDE)  [facility]   11/19/2025  9:40 AM Trang Gant MD [id number] [address]

## 2025-02-12 ENCOUNTER — OFFICE VISIT (OUTPATIENT)
Dept: INTERNAL MEDICINE CLINIC | Facility: CLINIC | Age: 40
End: 2025-02-12
Payer: COMMERCIAL

## 2025-02-12 VITALS
SYSTOLIC BLOOD PRESSURE: 106 MMHG | WEIGHT: 167 LBS | HEIGHT: 67 IN | HEART RATE: 70 BPM | DIASTOLIC BLOOD PRESSURE: 64 MMHG | RESPIRATION RATE: 18 BRPM | TEMPERATURE: 97 F | BODY MASS INDEX: 26.21 KG/M2 | OXYGEN SATURATION: 97 %

## 2025-02-12 DIAGNOSIS — J01.00 ACUTE NON-RECURRENT MAXILLARY SINUSITIS: Primary | ICD-10-CM

## 2025-02-12 PROCEDURE — 99213 OFFICE O/P EST LOW 20 MIN: CPT

## 2025-02-12 RX ORDER — DOXYCYCLINE 100 MG/1
100 CAPSULE ORAL 2 TIMES DAILY
Qty: 14 CAPSULE | Refills: 0 | Status: SHIPPED | OUTPATIENT
Start: 2025-02-12 | End: 2025-02-19

## 2025-02-12 NOTE — PROGRESS NOTES
Anusha Cho is a 39 year old female.   Chief Complaint   Patient presents with    Sinus Problem     Room 16 a-kb- has been ongoing for 2 weeks; has had chest xray- TB diagnosed with double ear infection;sinus infection- was on antibiotics- completed then 2 days later everything is slowly coming back     HPI:    Patient here today to follow up on increase cough and ear pain/fullness after finishing zpak. She reports seen in [facility] 1/28/25 follow up in office on 1/30/25 with Dr. Santos which zpak was ordered for AOM and patient did see improvement of symptoms. No new symptoms reported. She remains afebrile.       Allergies:  Allergies[1]   Current Meds:  Current Outpatient Medications   Medication Sig Dispense Refill    benzonatate 100 MG Oral Cap Take 1 capsule (100 mg total) by mouth 3 (three) times daily as needed for cough. 30 capsule 0    Cholecalciferol (VITAMIN D) 125 MCG (5000 UT) Oral Cap       Coenzyme Q10 (COQ-10) 100 MG Oral Cap       Riboflavin 400 MG Oral Tab       Turmeric (QC TUMERIC COMPLEX OR)       ondansetron (ZOFRAN) 4 mg tablet Take 1 tablet (4 mg total) by mouth every 8 (eight) hours as needed for Nausea. 30 tablet 5    Amphetamine-Dextroamphet ER 20 MG Oral Capsule SR 24 Hr Take 1 capsule (20 mg total) by mouth daily.      Rimegepant Sulfate (NURTEC) 75 MG Oral Tablet Dispersible Take 75 mg by mouth as needed. Take one tablet at onset of migraine.  Maximum dose in 24 hours is 1 tablet (75mg). 8 tablet 11    fluticasone propionate 50 MCG/ACT Nasal Suspension 2 sprays by Nasal route daily. 16 g 0    cetirizine-pseudoephedrine ER 5-120 MG Oral Tablet 12 Hr Take 1 tablet by mouth 2 (two) times daily. 30 tablet 0    LORazepam 0.5 MG Oral Tab Take 1 tablet (0.5 mg total) by mouth daily as needed.          PMH:     Past Medical History:    ADHD    Migraines    OTHER DISEASES    Pre-eclampsia (HCC)    Pregnancy-induced hypertension (HCC)       ROS:   Review of Systems   Constitutional: Negative.     HENT:  Positive for congestion, ear pain, postnasal drip and voice change.    Respiratory:  Positive for cough. Negative for chest tightness and shortness of breath.    Cardiovascular: Negative.    Gastrointestinal: Negative.    Neurological: Negative.             PHYSICAL EXAM:    /64   Pulse 70   Temp 97.2 °F (36.2 °C) (Temporal)   Resp 18   Ht 5' 7\" (1.702 m)   Wt 167 lb (75.8 kg)   LMP 10/14/2024 (Exact Date)   SpO2 97%   BMI 26.16 kg/m²   Physical Exam  Constitutional:       Appearance: Normal appearance. She is not ill-appearing or toxic-appearing.   HENT:      Right Ear: Tympanic membrane normal.      Left Ear: Tympanic membrane normal.      Mouth/Throat:      Mouth: Mucous membranes are moist.      Pharynx: Posterior oropharyngeal erythema present.   Pulmonary:      Effort: Pulmonary effort is normal.      Breath sounds: Normal breath sounds.   Neurological:      Mental Status: She is alert.       ASSESSMENT/ PLAN:   1. Acute non-recurrent maxillary sinusitis  Allergy to PCN, previously taken zpak. Discussed taking with food BID x 7 days. Follow up if symptoms fail to improve.   - doxycycline 100 MG Oral Cap; Take 1 capsule (100 mg total) by mouth 2 (two) times daily for 7 days.  Dispense: 14 capsule; Refill: 0      Health Maintenance Due   Topic Date Due    Annual Physical  11/01/2023    COVID-19 Vaccine (5 - 2024-25 season) 09/01/2024    Influenza Vaccine (1) 10/01/2024    Annual Depression Screening  01/01/2025     Pt indicates understanding and agrees to the plan.     Follow up if needed    MATILDA Hernandez          [1]   Allergies  Allergen Reactions    Pcns [Penicillins] HIVES    Triptans OTHER (SEE COMMENTS)     History of complex migraine (right sided sensory change, language change)- relative contraindication

## 2025-02-18 ENCOUNTER — HOSPITAL ENCOUNTER (OUTPATIENT)
Dept: MRI IMAGING | Facility: HOSPITAL | Age: 40
Discharge: HOME | End: 2025-02-18
Attending: NEUROLOGICAL SURGERY
Payer: COMMERCIAL

## 2025-02-18 DIAGNOSIS — G93.5 CHIARI I MALFORMATION (HCC): ICD-10-CM

## 2025-02-18 PROCEDURE — 72141 MRI NECK SPINE W/O DYE: CPT | Performed by: NEUROLOGICAL SURGERY

## 2025-02-18 PROCEDURE — 70551 MRI BRAIN STEM W/O DYE: CPT | Performed by: NEUROLOGICAL SURGERY

## 2025-02-18 PROCEDURE — 72146 MRI CHEST SPINE W/O DYE: CPT | Performed by: NEUROLOGICAL SURGERY

## 2025-02-21 ENCOUNTER — OFFICE VISIT (OUTPATIENT)
Dept: SURGERY | Facility: CLINIC | Age: 40
End: 2025-02-21
Payer: COMMERCIAL

## 2025-02-21 VITALS
BODY MASS INDEX: 25.9 KG/M2 | OXYGEN SATURATION: 97 % | DIASTOLIC BLOOD PRESSURE: 66 MMHG | HEIGHT: 67 IN | WEIGHT: 165 LBS | SYSTOLIC BLOOD PRESSURE: 114 MMHG | HEART RATE: 93 BPM

## 2025-02-21 DIAGNOSIS — G93.5 CHIARI MALFORMATION TYPE I (HCC): Primary | ICD-10-CM

## 2025-02-21 DIAGNOSIS — G95.0 SYRINX OF SPINAL CORD (HCC): ICD-10-CM

## 2025-02-21 PROCEDURE — 99213 OFFICE O/P EST LOW 20 MIN: CPT | Performed by: NURSE PRACTITIONER

## 2025-02-21 RX ORDER — DEXTROAMPHETAMINE SACCHARATE, AMPHETAMINE ASPARTATE, DEXTROAMPHETAMINE SULFATE AND AMPHETAMINE SULFATE 2.5; 2.5; 2.5; 2.5 MG/1; MG/1; MG/1; MG/1
TABLET ORAL
COMMUNITY
Start: 2025-02-10

## 2025-02-21 NOTE — PROGRESS NOTES
[facility]  Neurological Surgery Clinic Note    Anusha Cho  2/14/1985  [id number]  PCP: Arin Santos MD    REASON FOR VISIT:  Follow-up for Chiari malformation    HISTORY OF PRESENT ILLNESS:  Anusha Cho is a very pleasant 40 year old female who originally presented to clinic for evaluation of Chiari malformation found during headache workup.  She reports a longstanding history of migraines which have been progressively worsening.  And she notes that over the past few months she has been having a different type of moderate to mild headache that is frontotemporal and/or occipital, which is not tussive or exacerbated with Valsalva.  She reports that prior to the onset of these new headaches she was having vertiginous symptoms back in May that worsened when she was laying down but these resolved.  She had an episode in May that led to her CTA when she had tingling down her arms and legs with associated word finding difficulty particularly when texting.  Besides this episode she does not report consistent balance difficulty or fine motor dysfunction. MRI brain 5/28/2024 demonstrates 6.5 mm of tonsillar descent with mild crowding of the foramen magnum consistent with a Chiari malformation.  CTA head and neck 5/16/2024 is unremarkable.     Interval Hx 2/21/25:  Anusha has been following with Dr Elias for her headaches. She has been prescribed Nurtec which fortunately she has not needed very much. She denies any tussive headaches. No reports of any weakness, numbness, tingling in her upper extremities or unsteadiness of her gait.     PAST MEDICAL HISTORY:  Past Medical History:    ADHD    Migraines    OTHER DISEASES    Pre-eclampsia (HCC)    Pregnancy-induced hypertension (HCC)       PAST SURGICAL HISTORY:  Past Surgical History:   Procedure Laterality Date    Impact tooth rem bony w/comp Bilateral 2007    wisdom teeth x 4       FAMILY HISTORY:  family history includes Allergies in her sister;  Arthritis in her maternal aunt, maternal cousin female, maternal grandmother, and mother; Cancer in her paternal grandfather; Dementia in her maternal grandmother; Diabetes in her maternal aunt and maternal grandmother; Heart Disease in her maternal grandfather; High Cholesterol in her maternal aunt, maternal grandmother, and mother; Hypertension in her maternal aunt, maternal grandmother, and mother; Prostate Cancer in her maternal aunt.    SOCIAL HISTORY:   reports that she has never smoked. She has never used smokeless tobacco. She reports current alcohol use of about 2.0 standard drinks of alcohol per week. She reports that she does not use drugs.    ALLERGIES:  Allergies[1]    MEDICATIONS:  Medications Ordered Prior to Encounter[2]    REVIEW OF SYSTEMS:  A 10-point system was reviewed.  Pertinent positives and negatives are noted in HPI.      PHYSICAL EXAMINATION:  VITAL SIGNS: /66   Pulse 93   Ht 67\"   Wt 165 lb (74.8 kg)   LMP 10/14/2024 (Exact Date)   SpO2 97%   BMI 25.84 kg/m²     A&Ox3, no acute distress  PERRL, EOMi, FS, TM  Full strength x 4, no drift  Sensation intact     Imaging Review:  MRI CERVICAL+THORACIC SPINE  (CPT=72141/62329)    Result Date: 2/18/2025  CONCLUSION:   1. Chiari 1 malformation is stable.  Minimal syrinx between C3-C7 is stable.   2. No significant spinal canal or neural foraminal stenosis in the cervical spine or thoracic spine.   LOCATION:  [facility]   Dictated by (CST): Darwin Espana MD on 2/18/2025 at 12:33 PM     Finalized by (CST): Darwin Espana MD on 2/18/2025 at 12:39 PM       MRI BRAIN (CPT=70551)    Result Date: 2/18/2025  CONCLUSION:   1. No acute intracranial abnormality identified.  2. The cerebellar tonsils extend up to 8 mm below the level of the foramen magnum and there is minimal crowding of the foramen magnum suggesting a Chiari I malformation.  No significant cerebellar tonsillar dysmorphia.  3. Findings concerning for acute left maxillary  sinusitis.  Clinical correlation recommended.  LOCATION:  [facility]   Dictated by (CST): Dakota Donis MD on 2/18/2025 at 10:56 AM     Finalized by (CST): Dakota Donis MD on 2/18/2025 at 10:58 AM         I have reviewed the imaging personally and discussed with Dr. Morse.     ASSESSMENT:  40-year-old female with an incidental Chiari malformation     I spoke with the patient about the diagnosis of a Chiari malformation and we reviewed the imaging together.  We discussed the natural history and the signs and symptoms for which to seek medical attention.  We discussed the need for further surveillance imaging. We discussed that there is no indication for surgical intervention at this time due to the stability of the syrinx in the cervical spine.  She expressed understanding and agreement with the plan       Plan:  MRI c spine (December)  Follow up with Neurology - establish care after Dr Elias left   Watch for worsening symptoms - tussive headaches, balance changes, weakness upper extremities    Tish Parra, APRN, CNP  Neurological Surgery  [facility]    Care Time: 30 min including face to face time, chart review, imaging interpretation, and coordination of care         [1]   Allergies  Allergen Reactions    Pcns [Penicillins] HIVES    Triptans OTHER (SEE COMMENTS)     History of complex migraine (right sided sensory change, language change)- relative contraindication    [2]   Current Outpatient Medications on File Prior to Visit   Medication Sig Dispense Refill    amphetamine-dextroamphetamine 10 MG Oral Tab       Cholecalciferol (VITAMIN D) 125 MCG (5000 UT) Oral Cap       Coenzyme Q10 (COQ-10) 100 MG Oral Cap       Riboflavin 400 MG Oral Tab       Turmeric (QC TUMERIC COMPLEX OR)       ondansetron (ZOFRAN) 4 mg tablet Take 1 tablet (4 mg total) by mouth every 8 (eight) hours as needed for Nausea. 30 tablet 5    Amphetamine-Dextroamphet ER 20 MG Oral Capsule SR 24 Hr Take 1 capsule (20 mg  total) by mouth daily.      Rimegepant Sulfate (NURTEC) 75 MG Oral Tablet Dispersible Take 75 mg by mouth as needed. Take one tablet at onset of migraine.  Maximum dose in 24 hours is 1 tablet (75mg). 8 tablet 11    fluticasone propionate 50 MCG/ACT Nasal Suspension 2 sprays by Nasal route daily. 16 g 0    cetirizine-pseudoephedrine ER 5-120 MG Oral Tablet 12 Hr Take 1 tablet by mouth 2 (two) times daily. 30 tablet 0    LORazepam 0.5 MG Oral Tab Take 1 tablet (0.5 mg total) by mouth daily as needed.      benzonatate 100 MG Oral Cap Take 1 capsule (100 mg total) by mouth 3 (three) times daily as needed for cough. (Patient not taking: Reported on 2/21/2025) 30 capsule 0     No current facility-administered medications on file prior to visit.

## 2025-02-21 NOTE — PATIENT INSTRUCTIONS
MRI c spine (December)  Follow up with Neurology - establish care after Dr Elias left   Watch for worsening symptoms - tussive headaches, balance changes, weakness upper extremities    Refill policies:    Allow 2-3 business days for refills; controlled substances may take longer.  Contact your pharmacy at least 5 days prior to running out of medication and have them send an electronic request or submit request through the “request refill” option in your [employer] account.  Refills are not addressed on weekends; covering physicians do not authorize routine medications on weekends.  No narcotics or controlled substances are refilled after noon on Fridays or by on call physicians.  By law, narcotics must be electronically prescribed.  A 30 day supply with no refills is the maximum allowed.  If your prescription is due for a refill, you may be due for a follow up appointment.  To best provide you care, patients receiving routine medications need to be seen at least once a year.  Patients receiving narcotic/controlled substance medications need to be seen at least once every 3 months.  In the event that your preferred pharmacy does not have the requested medication in stock (e.g. Backordered), it is your responsibility to find another pharmacy that has the requested medication available.  We will gladly send a new prescription to that pharmacy at your request.    Scheduling Tests:    If your physician has ordered radiology tests such as MRI or CT scans, please contact Central Scheduling at [phone] right away to schedule the test.  Once scheduled, the [facility] Centralized Referral Team will work with your insurance carrier to obtain pre-certification or prior authorization.  Depending on your insurance carrier, approval may take 3-10 days.  It is highly recommended patients assure they have received an authorization before having a test performed.  If test is done without insurance authorization, patient may be responsible  for the entire amount billed.      Precertification and Prior Authorizations:  If your physician has recommended that you have a procedure or additional testing performed the [facility] Centralized Referral Team will contact your insurance carrier to obtain pre-certification or prior authorization.    You are strongly encouraged to contact your insurance carrier to verify that your procedure/test has been approved and is a COVERED benefit.  Although the [facility] Centralized Referral Team does its due diligence, the insurance carrier gives the disclaimer that \"Although the procedure is authorized, this does not guarantee payment.\"    Ultimately the patient is responsible for payment.   Thank you for your understanding in this matter.  Paperwork Completion:  If you require FMLA or disability paperwork for your recovery, please make sure to either drop it off or have it faxed to our office at [phone]. Be sure the form has your name and date of birth on it.  The form will be faxed to our Forms Department and they will complete it for you.  There is a 25$ fee for all forms that need to be filled out.  Please be aware there is a 10-14 day turnaround time.  You will need to sign a release of information (SATNAM) form if your paperwork does not come with one.  You may call the Forms Department with any questions at [phone].  Their fax number is [phone].

## 2025-02-21 NOTE — PROGRESS NOTES
Patient with Chiari I malformation is here today for 6 month follow up with repeat imaging    IMAGING:  - 2/18/25- MRI CERVICAL+THORACIC SPINE  - 2/18/25- MRI BRAIN

## 2025-02-22 ENCOUNTER — TELEPHONE (OUTPATIENT)
Dept: SURGERY | Facility: CLINIC | Age: 40
End: 2025-02-22

## 2025-02-22 PROBLEM — G95.0 SYRINX OF SPINAL CORD (HCC): Status: ACTIVE | Noted: 2025-02-22

## 2025-02-22 PROBLEM — G93.5 CHIARI MALFORMATION TYPE I (HCC): Status: ACTIVE | Noted: 2025-02-22

## 2025-02-22 NOTE — TELEPHONE ENCOUNTER
Please set a reminder to order follow-up imaging for December 2025.  MRI of the cervical spine without contrast for Chiari malformation and syrinx

## 2025-05-06 ENCOUNTER — E-VISIT (OUTPATIENT)
Dept: TELEHEALTH | Age: 40
End: 2025-05-06
Payer: COMMERCIAL

## 2025-05-06 DIAGNOSIS — N89.8 WHITE VAGINAL DISCHARGE: ICD-10-CM

## 2025-05-06 DIAGNOSIS — N76.0 ACUTE VAGINITIS: Primary | ICD-10-CM

## 2025-05-06 PROCEDURE — 99421 OL DIG E/M SVC 5-10 MIN: CPT | Performed by: PHYSICIAN ASSISTANT

## 2025-05-06 RX ORDER — FLUCONAZOLE 150 MG/1
150 TABLET ORAL ONCE
Qty: 1 TABLET | Refills: 0 | Status: SHIPPED | OUTPATIENT
Start: 2025-05-06 | End: 2025-05-06

## 2025-05-06 RX ORDER — SPIRONOLACTONE 50 MG/1
TABLET, FILM COATED ORAL
COMMUNITY
Start: 2025-03-29

## 2025-05-06 RX ORDER — TRETINOIN 0.25 MG/G
CREAM TOPICAL
COMMUNITY
Start: 2025-03-28

## 2025-05-06 NOTE — PROGRESS NOTES
Anusha Cho is a 40 year old female who initiated e-visit care today.    HPI:   See answers to questionnaire submission     Current Medications[1]   Past Medical History[2]   Past Surgical History[3]   Family History[4]   Social History:  Short Social Hx on File[5]      ASSESSMENT AND PLAN:       Diagnoses and all orders for this visit:    Acute vaginitis  -     fluconazole (DIFLUCAN) 150 MG Oral Tab; Take 1 tablet (150 mg total) by mouth once for 1 dose.    White vaginal discharge  -     fluconazole (DIFLUCAN) 150 MG Oral Tab; Take 1 tablet (150 mg total) by mouth once for 1 dose.    Advised Monistat 7 OTC is first line. If not improving can do single dose fluconazole, if still having symptoms advised in-person visit to rule out other etiologies.        Duration of  the service:  10 minutes      See [employer] message exchange and Patient Instructions for Comfort Care and patient education.          [1]   Current Outpatient Medications   Medication Sig Dispense Refill    Clindamycin Phosphate 1 % External Gel APPLY TO FACE EVERY MORNING AS SPOT TREATMENT      spironolactone 50 MG Oral Tab       tretinoin 0.025 % External Cream APPLY SMALL AMOUNT TOPICALLY TO FACE AT BEDTIME. FOLLOW WITH MOISTURIZER      amphetamine-dextroamphetamine 10 MG Oral Tab       Cholecalciferol (VITAMIN D) 125 MCG (5000 UT) Oral Cap       Coenzyme Q10 (COQ-10) 100 MG Oral Cap       Riboflavin 400 MG Oral Tab       Turmeric (QC TUMERIC COMPLEX OR)       ondansetron (ZOFRAN) 4 mg tablet Take 1 tablet (4 mg total) by mouth every 8 (eight) hours as needed for Nausea. 30 tablet 5    Amphetamine-Dextroamphet ER 20 MG Oral Capsule SR 24 Hr Take 1 capsule (20 mg total) by mouth daily.      Rimegepant Sulfate (NURTEC) 75 MG Oral Tablet Dispersible Take 75 mg by mouth as needed. Take one tablet at onset of migraine.  Maximum dose in 24 hours is 1 tablet (75mg). 8 tablet 11    fluticasone propionate 50 MCG/ACT Nasal Suspension 2 sprays by Nasal  route daily. 16 g 0    cetirizine-pseudoephedrine ER 5-120 MG Oral Tablet 12 Hr Take 1 tablet by mouth 2 (two) times daily. 30 tablet 0    LORazepam 0.5 MG Oral Tab Take 1 tablet (0.5 mg total) by mouth daily as needed.     [2]   Past Medical History:   ADHD    Migraines    OTHER DISEASES    Pre-eclampsia (HCC)    Pregnancy-induced hypertension (HCC)   [3]   Past Surgical History:  Procedure Laterality Date    Impact tooth rem bony w/comp Bilateral 2007    wisdom teeth x 4   [4]   Family History  Problem Relation Age of Onset    Hypertension Mother     High Cholesterol Mother     Arthritis Mother         RA    Hypertension Maternal Grandmother     High Cholesterol Maternal Grandmother     Diabetes Maternal Grandmother     Dementia Maternal Grandmother     Arthritis Maternal Grandmother         RA    Allergies Sister     Heart Disease Maternal Grandfather     Cancer Paternal Grandfather         Pancreatic    Hypertension Maternal Aunt     High Cholesterol Maternal Aunt     Diabetes Maternal Aunt     Other (Prostate Cancer) Maternal Aunt         Pat uncle    Arthritis Maternal Aunt         RA    Arthritis Maternal Cousin Female         RA   [5]   Social History  Socioeconomic History    Marital status:      Spouse name: Xavier    Number of children: 0    Years of education: 16   Occupational History    Occupation: Product management   Tobacco Use    Smoking status: Never    Smokeless tobacco: Never   Vaping Use    Vaping status: Never Used   Substance and Sexual Activity    Alcohol use: Yes     Alcohol/week: 2.0 standard drinks of alcohol     Types: 2 Standard drinks or equivalent per week     Comment: occasional    Drug use: No    Sexual activity: Not Currently     Partners: Male   Other Topics Concern     Service No    Blood Transfusions No    Caffeine Concern Yes     Comment: 1-2 cup coffee daily    Occupational Exposure No    Hobby Hazards No    Sleep Concern No    Back Care No    Exercise No    Bike  Helmet No    Self-Exams Yes   Social History Narrative    Lives with

## 2025-05-07 ENCOUNTER — LAB ENCOUNTER (OUTPATIENT)
Dept: LAB | Age: 40
End: 2025-05-07
Attending: INTERNAL MEDICINE
Payer: COMMERCIAL

## 2025-05-07 ENCOUNTER — OFFICE VISIT (OUTPATIENT)
Dept: NEUROLOGY | Facility: CLINIC | Age: 40
End: 2025-05-07
Payer: COMMERCIAL

## 2025-05-07 VITALS
WEIGHT: 170.38 LBS | HEART RATE: 89 BPM | SYSTOLIC BLOOD PRESSURE: 118 MMHG | RESPIRATION RATE: 16 BRPM | DIASTOLIC BLOOD PRESSURE: 74 MMHG | BODY MASS INDEX: 27 KG/M2

## 2025-05-07 DIAGNOSIS — G93.5 CHIARI MALFORMATION TYPE I (HCC): ICD-10-CM

## 2025-05-07 DIAGNOSIS — G43.109 MIGRAINE WITH AURA AND WITHOUT STATUS MIGRAINOSUS, NOT INTRACTABLE: Primary | ICD-10-CM

## 2025-05-07 DIAGNOSIS — I73.00 RAYNAUD'S PHENOMENON WITHOUT GANGRENE: ICD-10-CM

## 2025-05-07 LAB
CRP SERPL-MCNC: <0.4 MG/DL (ref ?–0.5)
ERYTHROCYTE [SEDIMENTATION RATE] IN BLOOD: 22 MM/HR (ref 0–20)
VIT D+METAB SERPL-MCNC: 38.3 NG/ML (ref 30–100)

## 2025-05-07 PROCEDURE — 36415 COLL VENOUS BLD VENIPUNCTURE: CPT

## 2025-05-07 PROCEDURE — 86140 C-REACTIVE PROTEIN: CPT | Performed by: INTERNAL MEDICINE

## 2025-05-07 PROCEDURE — 99214 OFFICE O/P EST MOD 30 MIN: CPT | Performed by: OTHER

## 2025-05-07 PROCEDURE — 82306 VITAMIN D 25 HYDROXY: CPT

## 2025-05-07 PROCEDURE — 85652 RBC SED RATE AUTOMATED: CPT | Performed by: INTERNAL MEDICINE

## 2025-05-07 RX ORDER — RIMEGEPANT SULFATE 75 MG/75MG
75 TABLET, ORALLY DISINTEGRATING ORAL AS NEEDED
Qty: 8 TABLET | Refills: 11 | Status: SHIPPED | OUTPATIENT
Start: 2025-05-07 | End: 2026-05-07

## 2025-05-07 RX ORDER — ONDANSETRON 4 MG/1
4 TABLET, FILM COATED ORAL EVERY 8 HOURS PRN
Qty: 30 TABLET | Refills: 5 | Status: SHIPPED | OUTPATIENT
Start: 2025-05-07

## 2025-05-07 NOTE — PROGRESS NOTES
[facility] Neurology Outpatient Progress Note  Date of service: 5/7/2025      ICD-10-CM    1. Migraine with aura and without status migrainosus, not intractable  G43.109 ondansetron (ZOFRAN) 4 mg tablet      2. Chiari malformation type I (HCC)  G93.5         Assessment & Plan  Migraines improved with Nurtec, 2-3 episodes/month. No side effects. Hormonal changes can be a trigger.   - Refill Nurtec and Zofran.  - Annual follow-ups for medication refills unless symptoms change.    Chiari malformation  Chiari malformation benign, not causing migraines. No neurological intervention necessary.     Subjective:   History of Present Illness  Anusha Cho is a 40 year old female with migraines who presents for a neurology follow-up.    Her migraines have improved significantly since starting treatment with Nurtec. She experiences migraines two to three times per month, which is a decrease in frequency. Nurtec, in combination with Zofran for nausea, has been effective. She sometimes experiences nausea with her headaches but has not reported any side effects from these medications.    There was a past concern that her migraines might be related to a Chiari malformation. Instead, there is a consideration that her migraines may be hormonally influenced, as they began to recur approximately two and a half years after her last childbirth. She has not noticed a direct correlation with her menstrual cycle, but she has developed hormonal acne, suggesting a possible hormonal component.    In terms of social history, she is active in caring for her toddlers, which she describes as a form of physical exercise.  Former pt of Dr Elias.    History/Other:   REVIEW OF SYSTEMS:  A 10-point system was reviewed. Pertinent positives and negatives are noted as above     Medications - Current[1]  Allergies:  Allergies[2]  Past Medical History[3]  Past Surgical History[4]  Social History:  Social History     Tobacco Use    Smoking status: Never     Smokeless tobacco: Never   Substance Use Topics    Alcohol use: Yes     Alcohol/week: 2.0 standard drinks of alcohol     Types: 2 Standard drinks or equivalent per week     Comment: occasional     Family History[5]   Patient denies any pertinent family history associated with the current problem    Objective:   Neurological Examination:  /74   Pulse 89   Resp 16   Wt 170 lb 6.4 oz (77.3 kg)   LMP 10/14/2024 (Exact Date)   BMI 26.69 kg/m²   Mental status: A & O  Language: no aphasia  Speech: no dysarthria  CN II-XII: intact   Motor strength: 5/5 all extremities  Tone: normal  Coordination: normal  Sensory: symmetric  Gait: normal    Test reviewed on 5/7/2025      Marta Pappas MD  Neurology  [facility]  5/7/2025, 9:47 AM  CC: Arin Santos MD       [1]   Current Outpatient Medications:     Rimegepant Sulfate (NURTEC) 75 MG Oral Tablet Dispersible, Take 75 mg by mouth as needed. Take one tablet at onset of migraine.  Maximum dose in 24 hours is 1 tablet (75mg)., Disp: 8 tablet, Rfl: 11    ondansetron (ZOFRAN) 4 mg tablet, Take 1 tablet (4 mg total) by mouth every 8 (eight) hours as needed for Nausea., Disp: 30 tablet, Rfl: 5    Clindamycin Phosphate 1 % External Gel, APPLY TO FACE EVERY MORNING AS SPOT TREATMENT, Disp: , Rfl:     spironolactone 50 MG Oral Tab, , Disp: , Rfl:     tretinoin 0.025 % External Cream, APPLY SMALL AMOUNT TOPICALLY TO FACE AT BEDTIME. FOLLOW WITH MOISTURIZER, Disp: , Rfl:     amphetamine-dextroamphetamine 10 MG Oral Tab, , Disp: , Rfl:     Cholecalciferol (VITAMIN D) 125 MCG (5000 UT) Oral Cap, , Disp: , Rfl:     Coenzyme Q10 (COQ-10) 100 MG Oral Cap, , Disp: , Rfl:     Riboflavin 400 MG Oral Tab, , Disp: , Rfl:     Turmeric (QC TUMERIC COMPLEX OR), , Disp: , Rfl:     Amphetamine-Dextroamphet ER 20 MG Oral Capsule SR 24 Hr, Take 1 capsule (20 mg total) by mouth daily., Disp: , Rfl:     fluticasone propionate 50 MCG/ACT Nasal Suspension, 2 sprays by Nasal route  daily., Disp: 16 g, Rfl: 0    cetirizine-pseudoephedrine ER 5-120 MG Oral Tablet 12 Hr, Take 1 tablet by mouth 2 (two) times daily., Disp: 30 tablet, Rfl: 0    LORazepam 0.5 MG Oral Tab, Take 1 tablet (0.5 mg total) by mouth daily as needed., Disp: , Rfl:   [2]   Allergies  Allergen Reactions    Pcns [Penicillins] HIVES    Triptans OTHER (SEE COMMENTS)     History of complex migraine (right sided sensory change, language change)- relative contraindication    [3]   Past Medical History:   ADHD    Migraines    OTHER DISEASES    Pre-eclampsia (HCC)    Pregnancy-induced hypertension (HCC)   [4]   Past Surgical History:  Procedure Laterality Date    Impact tooth rem bony w/comp Bilateral 2007    wisdom teeth x 4   [5]   Family History  Problem Relation Age of Onset    Hypertension Mother     High Cholesterol Mother     Arthritis Mother         RA    Hypertension Maternal Grandmother     High Cholesterol Maternal Grandmother     Diabetes Maternal Grandmother     Dementia Maternal Grandmother     Arthritis Maternal Grandmother         RA    Allergies Sister     Heart Disease Maternal Grandfather     Cancer Paternal Grandfather         Pancreatic    Hypertension Maternal Aunt     High Cholesterol Maternal Aunt     Diabetes Maternal Aunt     Other (Prostate Cancer) Maternal Aunt         Pat uncle    Arthritis Maternal Aunt         RA    Arthritis Maternal Cousin Female         RA

## 2025-05-07 NOTE — PATIENT INSTRUCTIONS
Refill policies:    Allow 2-3 business days for refills; controlled substances may take longer.  Contact your pharmacy at least 5 days prior to running out of medication and have them send an electronic request or submit request through the “request refill” option in your [employer] account.  Refills are not addressed on weekends; covering physicians do not authorize routine medications on weekends.  No narcotics or controlled substances are refilled after noon on Fridays or by on call physicians.  By law, narcotics must be electronically prescribed.  A 30 day supply with no refills is the maximum allowed.  If your prescription is due for a refill, you may be due for a follow up appointment.  To best provide you care, patients receiving routine medications need to be seen at least once a year.  Patients receiving narcotic/controlled substance medications need to be seen at least once every 3 months.  In the event that your preferred pharmacy does not have the requested medication in stock (e.g. Backordered), it is your responsibility to find another pharmacy that has the requested medication available.  We will gladly send a new prescription to that pharmacy at your request.    Scheduling Tests:    If your physician has ordered radiology tests such as MRI or CT scans, please contact Central Scheduling at [phone] right away to schedule the test.  Once scheduled, the [facility] Centralized Referral Team will work with your insurance carrier to obtain pre-certification or prior authorization.  Depending on your insurance carrier, approval may take 3-10 days.  It is highly recommended patients assure they have received an authorization before having a test performed.  If test is done without insurance authorization, patient may be responsible for the entire amount billed.      Precertification and Prior Authorizations:  If your physician has recommended that you have a procedure or additional testing performed the [facility]  Centralized Referral Team will contact your insurance carrier to obtain pre-certification or prior authorization.    You are strongly encouraged to contact your insurance carrier to verify that your procedure/test has been approved and is a COVERED benefit.  Although the [facility] Centralized Referral Team does its due diligence, the insurance carrier gives the disclaimer that \"Although the procedure is authorized, this does not guarantee payment.\"    Ultimately the patient is responsible for payment.   Thank you for your understanding in this matter.  Paperwork Completion:  If you require FMLA or disability paperwork for your recovery, please make sure to either drop it off or have it faxed to our office at [phone]. Be sure the form has your name and date of birth on it.  The form will be faxed to our Forms Department and they will complete it for you.  There is a 25$ fee for all forms that need to be filled out.  Please be aware there is a 10-14 day turnaround time.  You will need to sign a release of information (SATNAM) form if your paperwork does not come with one.  You may call the Forms Department with any questions at [phone].  Their fax number is [phone].

## (undated) NOTE — LETTER
Dear new mom:    We've missed you! The nurses of [facility] have tried to reach you by phone to ask if you had any questions regarding your health or the care of your new little one.     Please feel free to call your doctor with an